# Patient Record
Sex: FEMALE | Race: BLACK OR AFRICAN AMERICAN | NOT HISPANIC OR LATINO | Employment: FULL TIME | URBAN - METROPOLITAN AREA
[De-identification: names, ages, dates, MRNs, and addresses within clinical notes are randomized per-mention and may not be internally consistent; named-entity substitution may affect disease eponyms.]

---

## 2017-12-02 ENCOUNTER — HOSPITAL ENCOUNTER (EMERGENCY)
Facility: HOSPITAL | Age: 37
Discharge: HOME | End: 2017-12-02
Attending: EMERGENCY MEDICINE
Payer: COMMERCIAL

## 2017-12-02 VITALS
WEIGHT: 168 LBS | RESPIRATION RATE: 20 BRPM | TEMPERATURE: 99 F | OXYGEN SATURATION: 100 % | DIASTOLIC BLOOD PRESSURE: 74 MMHG | HEART RATE: 77 BPM | SYSTOLIC BLOOD PRESSURE: 124 MMHG | BODY MASS INDEX: 27 KG/M2 | HEIGHT: 66 IN

## 2017-12-02 DIAGNOSIS — M25.559 HIP PAIN: ICD-10-CM

## 2017-12-02 DIAGNOSIS — M54.9 BACK PAIN: ICD-10-CM

## 2017-12-02 DIAGNOSIS — M25.579 ANKLE PAIN: ICD-10-CM

## 2017-12-02 DIAGNOSIS — M25.562 LEFT KNEE PAIN: ICD-10-CM
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
## 2017-12-02 LAB
B-HCG UR QL: NEGATIVE
CTP QC/QA: YES

## 2017-12-02 PROCEDURE — 25000003 PHARM REV CODE 250: Performed by: PHYSICIAN ASSISTANT

## 2017-12-02 PROCEDURE — 81025 URINE PREGNANCY TEST: CPT | Performed by: PHYSICIAN ASSISTANT

## 2017-12-02 PROCEDURE — 99284 EMERGENCY DEPT VISIT MOD MDM: CPT

## 2017-12-02 RX ORDER — TRAZODONE HYDROCHLORIDE 50 MG/1
50 TABLET ORAL NIGHTLY
COMMUNITY
End: 2019-02-08 | Stop reason: SDUPTHER

## 2017-12-02 RX ORDER — MELOXICAM 7.5 MG/1
7.5 TABLET ORAL DAILY
Status: DISCONTINUED | OUTPATIENT
Start: 2017-12-02 | End: 2017-12-02 | Stop reason: HOSPADM

## 2017-12-02 RX ORDER — MELOXICAM 7.5 MG/1
7.5 TABLET ORAL DAILY
Qty: 15 TABLET | Refills: 0 | Status: SHIPPED | OUTPATIENT
Start: 2017-12-02 | End: 2017-12-17

## 2017-12-02 RX ORDER — METHOCARBAMOL 500 MG/1
1000 TABLET, FILM COATED ORAL 3 TIMES DAILY PRN
Qty: 30 TABLET | Refills: 0 | Status: SHIPPED | OUTPATIENT
Start: 2017-12-02 | End: 2017-12-07

## 2017-12-02 RX ORDER — KETOROLAC TROMETHAMINE 30 MG/ML
15 INJECTION, SOLUTION INTRAMUSCULAR; INTRAVENOUS
Status: DISCONTINUED | OUTPATIENT
Start: 2017-12-02 | End: 2017-12-02

## 2017-12-02 RX ORDER — CITALOPRAM 20 MG/1
20 TABLET, FILM COATED ORAL DAILY
COMMUNITY
End: 2019-02-08 | Stop reason: SDUPTHER

## 2017-12-02 RX ADMIN — MELOXICAM 7.5 MG: 7.5 TABLET ORAL at 10:12
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
## 2017-12-02 NOTE — DISCHARGE INSTRUCTIONS
Stop taking Ibuprofen and begin taking Mobic as prescribed for pain. Also take Robaxin, muscle relaxer as needed for pain. Caution with using this as it can make you drowsy. Do not take before driving.     Follow up with your primary care doctor or one provided in 2 days.     Return to ER for worsening symptoms, bowel or bladder incontinence, numbness or tingling in groin or rectal region or as needed.
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
## 2018-05-20 ENCOUNTER — HOSPITAL ENCOUNTER (EMERGENCY)
Facility: HOSPITAL | Age: 38
Discharge: HOME | End: 2018-05-20
Attending: EMERGENCY MEDICINE
Payer: COMMERCIAL

## 2018-05-20 VITALS
OXYGEN SATURATION: 99 % | HEIGHT: 67 IN | TEMPERATURE: 98 F | BODY MASS INDEX: 25.9 KG/M2 | DIASTOLIC BLOOD PRESSURE: 74 MMHG | HEART RATE: 95 BPM | RESPIRATION RATE: 18 BRPM | SYSTOLIC BLOOD PRESSURE: 116 MMHG | WEIGHT: 165 LBS

## 2018-05-20 DIAGNOSIS — M54.2 NECK PAIN: ICD-10-CM
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
## 2018-05-20 LAB
B-HCG UR QL: NEGATIVE
CTP QC/QA: YES

## 2018-05-20 PROCEDURE — 63600175 PHARM REV CODE 636 W HCPCS: Performed by: NURSE PRACTITIONER

## 2018-05-20 PROCEDURE — 96372 THER/PROPH/DIAG INJ SC/IM: CPT

## 2018-05-20 PROCEDURE — 81025 URINE PREGNANCY TEST: CPT | Performed by: PHYSICIAN ASSISTANT

## 2018-05-20 PROCEDURE — 99284 EMERGENCY DEPT VISIT MOD MDM: CPT | Mod: 25

## 2018-05-20 RX ORDER — KETOROLAC TROMETHAMINE 30 MG/ML
15 INJECTION, SOLUTION INTRAMUSCULAR; INTRAVENOUS
Status: COMPLETED | OUTPATIENT
Start: 2018-05-20 | End: 2018-05-20

## 2018-05-20 RX ORDER — CYCLOBENZAPRINE HCL 10 MG
10 TABLET ORAL 3 TIMES DAILY PRN
Qty: 15 TABLET | Refills: 0 | Status: SHIPPED | OUTPATIENT
Start: 2018-05-20 | End: 2018-05-25

## 2018-05-20 RX ORDER — IBUPROFEN 400 MG/1
400 TABLET ORAL EVERY 6 HOURS PRN
Qty: 20 TABLET | Refills: 0 | Status: SHIPPED | OUTPATIENT
Start: 2018-05-20 | End: 2018-06-06 | Stop reason: HOSPADM

## 2018-05-20 RX ADMIN — KETOROLAC TROMETHAMINE 15 MG: 30 INJECTION INTRAMUSCULAR; INTRAVENOUS at 10:05
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
## 2018-05-20 NOTE — DISCHARGE INSTRUCTIONS
You have been prescribed FLEXERIL for pain. Please do not take this medication while working, drinking alcohol, swimming, or while driving/operating heavy machinery. This medication may cause drowsiness, impair judgment, and reduce physical capabilities.    You have been prescribed ibuprofen for pain. This is an Non-Steroidal Anti-Inflammatory (NSAID) Medication. Please do not take any additional NSAIDs while you are taking this medication including (Advil, Aleve, Motrin, Ibuprofen, Mobic\meloxicam, Naprosyn, etc.). Please stop taking this medication if you experience: weakness, itching, yellow skin or eyes, joint pains, vomiting blood, blood or black stools, unusual weight gain, or swelling in your arms, legs, hands, or feet.     Please return to the Emergency Department for any new or worsening symptoms including: fever, chest pain, shortness of breath, loss of consciousness, dizziness, weakness, or any other concerns.     Please follow up with your Primary Care Provider within in the week. If you do not have one, you may contact the one listed on your discharge paperwork or you may also call the [facility] Appointment Desk at [phone] to schedule an appointment with one.     Please take all medication as prescribed.
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
## 2018-06-06 ENCOUNTER — HOSPITAL ENCOUNTER (EMERGENCY)
Facility: HOSPITAL | Age: 38
Discharge: HOME | End: 2018-06-06
Attending: EMERGENCY MEDICINE
Payer: COMMERCIAL

## 2018-06-06 VITALS
HEART RATE: 74 BPM | OXYGEN SATURATION: 97 % | TEMPERATURE: 98 F | HEIGHT: 66 IN | SYSTOLIC BLOOD PRESSURE: 150 MMHG | RESPIRATION RATE: 17 BRPM | DIASTOLIC BLOOD PRESSURE: 56 MMHG | BODY MASS INDEX: 25.71 KG/M2 | WEIGHT: 160 LBS

## 2018-06-06 DIAGNOSIS — M25.519 SHOULDER PAIN: ICD-10-CM

## 2018-06-06 DIAGNOSIS — M54.2 NECK PAIN: Primary | ICD-10-CM

## 2018-06-06 LAB
B-HCG UR QL: NEGATIVE
CTP QC/QA: YES

## 2018-06-06 PROCEDURE — 96372 THER/PROPH/DIAG INJ SC/IM: CPT

## 2018-06-06 PROCEDURE — 25000003 PHARM REV CODE 250: Performed by: PHYSICIAN ASSISTANT

## 2018-06-06 PROCEDURE — 63600175 PHARM REV CODE 636 W HCPCS: Performed by: PHYSICIAN ASSISTANT

## 2018-06-06 PROCEDURE — 99284 EMERGENCY DEPT VISIT MOD MDM: CPT | Mod: 25

## 2018-06-06 PROCEDURE — 81025 URINE PREGNANCY TEST: CPT | Performed by: NURSE PRACTITIONER

## 2018-06-06 RX ORDER — CYCLOBENZAPRINE HCL 5 MG
5 TABLET ORAL 3 TIMES DAILY PRN
COMMUNITY
End: 2018-06-06 | Stop reason: HOSPADM

## 2018-06-06 RX ORDER — CYCLOBENZAPRINE HCL 10 MG
10 TABLET ORAL 3 TIMES DAILY PRN
Qty: 12 TABLET | Refills: 0 | Status: SHIPPED | OUTPATIENT
Start: 2018-06-06 | End: 2018-06-06 | Stop reason: HOSPADM

## 2018-06-06 RX ORDER — TIZANIDINE 4 MG/1
2 TABLET ORAL EVERY 8 HOURS
Qty: 12 TABLET | Refills: 0 | Status: SHIPPED | OUTPATIENT
Start: 2018-06-06 | End: 2018-06-13

## 2018-06-06 RX ORDER — ETODOLAC 200 MG/1
200 CAPSULE ORAL 3 TIMES DAILY PRN
Qty: 20 CAPSULE | Refills: 0 | Status: SHIPPED | OUTPATIENT
Start: 2018-06-06 | End: 2018-06-06

## 2018-06-06 RX ORDER — KETOROLAC TROMETHAMINE 30 MG/ML
15 INJECTION, SOLUTION INTRAMUSCULAR; INTRAVENOUS
Status: COMPLETED | OUTPATIENT
Start: 2018-06-06 | End: 2018-06-06

## 2018-06-06 RX ORDER — LIDOCAINE 50 MG/G
1 PATCH TOPICAL
Status: DISCONTINUED | OUTPATIENT
Start: 2018-06-06 | End: 2018-06-06 | Stop reason: HOSPADM

## 2018-06-06 RX ORDER — ETODOLAC 200 MG/1
200 CAPSULE ORAL 3 TIMES DAILY PRN
Qty: 20 CAPSULE | Refills: 0 | Status: SHIPPED | OUTPATIENT
Start: 2018-06-06 | End: 2018-06-13

## 2018-06-06 RX ORDER — LIDOCAINE 50 MG/G
1 PATCH TOPICAL DAILY
Qty: 15 PATCH | Refills: 0 | Status: SHIPPED | OUTPATIENT
Start: 2018-06-06 | End: 2018-06-21

## 2018-06-06 RX ADMIN — LIDOCAINE 1 PATCH: 50 PATCH TOPICAL at 08:06

## 2018-06-06 RX ADMIN — KETOROLAC TROMETHAMINE 30 MG: 30 INJECTION, SOLUTION INTRAMUSCULAR at 08:06
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
## 2018-06-07 NOTE — DISCHARGE INSTRUCTIONS
Take medications as prescribed.   Follow up with Spine Surgery. Return to ER for worsening symptoms or as needed.

## 2019-01-15 ENCOUNTER — LAB VISIT (OUTPATIENT)
Dept: LAB | Facility: HOSPITAL | Age: 39
End: 2019-01-15
Attending: INTERNAL MEDICINE
Payer: COMMERCIAL

## 2019-01-15 ENCOUNTER — OFFICE VISIT (OUTPATIENT)
Dept: FAMILY MEDICINE | Facility: CLINIC | Age: 39
End: 2019-01-15
Payer: COMMERCIAL

## 2019-01-15 VITALS
HEIGHT: 66 IN | DIASTOLIC BLOOD PRESSURE: 94 MMHG | HEART RATE: 81 BPM | SYSTOLIC BLOOD PRESSURE: 142 MMHG | BODY MASS INDEX: 29.58 KG/M2 | TEMPERATURE: 99 F | WEIGHT: 184.06 LBS | OXYGEN SATURATION: 99 %

## 2019-01-15 DIAGNOSIS — R03.0 ELEVATED BLOOD PRESSURE READING: ICD-10-CM

## 2019-01-15 DIAGNOSIS — Z00.00 HEALTH CARE MAINTENANCE: ICD-10-CM

## 2019-01-15 DIAGNOSIS — Z13.220 LIPID SCREENING: ICD-10-CM

## 2019-01-15 DIAGNOSIS — F33.0 MILD EPISODE OF RECURRENT MAJOR DEPRESSIVE DISORDER: ICD-10-CM

## 2019-01-15 DIAGNOSIS — G47.00 INSOMNIA, UNSPECIFIED TYPE: ICD-10-CM

## 2019-01-15 DIAGNOSIS — Z72.0 TOBACCO ABUSE: ICD-10-CM

## 2019-01-15 DIAGNOSIS — N93.8 DYSFUNCTIONAL UTERINE BLEEDING: ICD-10-CM

## 2019-01-15 DIAGNOSIS — N93.8 DYSFUNCTIONAL UTERINE BLEEDING: Primary | ICD-10-CM

## 2019-01-15 LAB
ALBUMIN SERPL BCP-MCNC: 3.9 G/DL
ALP SERPL-CCNC: 56 U/L
ALT SERPL W/O P-5'-P-CCNC: 11 U/L
ANION GAP SERPL CALC-SCNC: 4 MMOL/L
AST SERPL-CCNC: 15 U/L
BASOPHILS # BLD AUTO: 0.01 K/UL
BASOPHILS NFR BLD: 0.2 %
BILIRUB SERPL-MCNC: 0.2 MG/DL
BUN SERPL-MCNC: 9 MG/DL
CALCIUM SERPL-MCNC: 9.3 MG/DL
CHLORIDE SERPL-SCNC: 108 MMOL/L
CHOLEST SERPL-MCNC: 307 MG/DL
CHOLEST/HDLC SERPL: 3.4 {RATIO}
CO2 SERPL-SCNC: 28 MMOL/L
CREAT SERPL-MCNC: 0.8 MG/DL
DIFFERENTIAL METHOD: ABNORMAL
EOSINOPHIL # BLD AUTO: 0.1 K/UL
EOSINOPHIL NFR BLD: 2.2 %
ERYTHROCYTE [DISTWIDTH] IN BLOOD BY AUTOMATED COUNT: 16.1 %
EST. GFR  (AFRICAN AMERICAN): >60 ML/MIN/1.73 M^2
EST. GFR  (NON AFRICAN AMERICAN): >60 ML/MIN/1.73 M^2
ESTIMATED AVG GLUCOSE: 108 MG/DL
GLUCOSE SERPL-MCNC: 83 MG/DL
HBA1C MFR BLD HPLC: 5.4 %
HCT VFR BLD AUTO: 31.3 %
HDLC SERPL-MCNC: 89 MG/DL
HDLC SERPL: 29 %
HGB BLD-MCNC: 9.8 G/DL
LDLC SERPL CALC-MCNC: 205.2 MG/DL
LYMPHOCYTES # BLD AUTO: 1.7 K/UL
LYMPHOCYTES NFR BLD: 41.1 %
MCH RBC QN AUTO: 26.5 PG
MCHC RBC AUTO-ENTMCNC: 31.3 G/DL
MCV RBC AUTO: 85 FL
MONOCYTES # BLD AUTO: 0.3 K/UL
MONOCYTES NFR BLD: 6.4 %
NEUTROPHILS # BLD AUTO: 2 K/UL
NEUTROPHILS NFR BLD: 50.1 %
NONHDLC SERPL-MCNC: 218 MG/DL
PLATELET # BLD AUTO: 354 K/UL
PMV BLD AUTO: 9.1 FL
POTASSIUM SERPL-SCNC: 4.6 MMOL/L
PROT SERPL-MCNC: 7.6 G/DL
RBC # BLD AUTO: 3.7 M/UL
SODIUM SERPL-SCNC: 140 MMOL/L
TRIGL SERPL-MCNC: 64 MG/DL
TSH SERPL DL<=0.005 MIU/L-ACNC: 0.43 UIU/ML
WBC # BLD AUTO: 4.06 K/UL

## 2019-01-15 PROCEDURE — 99999 PR PBB SHADOW E&M-EST. PATIENT-LVL IV: CPT | Mod: PBBFAC,,, | Performed by: INTERNAL MEDICINE

## 2019-01-15 PROCEDURE — 84443 ASSAY THYROID STIM HORMONE: CPT

## 2019-01-15 PROCEDURE — 3008F PR BODY MASS INDEX (BMI) DOCUMENTED: ICD-10-PCS | Mod: CPTII,S$GLB,, | Performed by: INTERNAL MEDICINE

## 2019-01-15 PROCEDURE — 85025 COMPLETE CBC W/AUTO DIFF WBC: CPT

## 2019-01-15 PROCEDURE — 82672 ASSAY OF ESTROGEN: CPT

## 2019-01-15 PROCEDURE — 82679 ASSAY OF ESTRONE: CPT

## 2019-01-15 PROCEDURE — 83036 HEMOGLOBIN GLYCOSYLATED A1C: CPT

## 2019-01-15 PROCEDURE — 99204 PR OFFICE/OUTPT VISIT, NEW, LEVL IV, 45-59 MIN: ICD-10-PCS | Mod: S$GLB,,, | Performed by: INTERNAL MEDICINE

## 2019-01-15 PROCEDURE — 80053 COMPREHEN METABOLIC PANEL: CPT

## 2019-01-15 PROCEDURE — 84144 ASSAY OF PROGESTERONE: CPT

## 2019-01-15 PROCEDURE — 99204 OFFICE O/P NEW MOD 45 MIN: CPT | Mod: S$GLB,,, | Performed by: INTERNAL MEDICINE

## 2019-01-15 PROCEDURE — 3008F BODY MASS INDEX DOCD: CPT | Mod: CPTII,S$GLB,, | Performed by: INTERNAL MEDICINE

## 2019-01-15 PROCEDURE — 99999 PR PBB SHADOW E&M-EST. PATIENT-LVL IV: ICD-10-PCS | Mod: PBBFAC,,, | Performed by: INTERNAL MEDICINE

## 2019-01-15 PROCEDURE — 36415 COLL VENOUS BLD VENIPUNCTURE: CPT | Mod: PO

## 2019-01-15 PROCEDURE — 82670 ASSAY OF TOTAL ESTRADIOL: CPT | Mod: 91

## 2019-01-15 PROCEDURE — 80061 LIPID PANEL: CPT

## 2019-01-15 RX ORDER — LORATADINE 10 MG/1
10 TABLET ORAL DAILY
Refills: 1 | COMMUNITY
Start: 2019-01-07 | End: 2020-08-25

## 2019-01-15 NOTE — PROGRESS NOTES
SUBJECTIVE     Chief Complaint   Patient presents with    Establish Care    Menstrual Problem     Dec. lasted so long. Late this month.    Depression     been on meds x 2 years. not sure if it working. Feeling mor emotional at times.        MEHUL France is a 38 y.o. female with multiple medical diagnoses as listed in the medical history and problem list that presents for establishment of care. Pt presents as a transfer from a different facility as her insurance changed. Pt reports a recent issue with her menstrual cycle which was a cycle lasting ~2 weeks(10/27-11/6) then 11/29-12/10 then 1/3-1/8. This past month it was a week late, but was normal. She denies having any issues with her menses prior to this.     PAST MEDICAL HISTORY:  Past Medical History:   Diagnosis Date    Depression        PAST SURGICAL HISTORY:  Past Surgical History:   Procedure Laterality Date    BACK SURGERY         SOCIAL HISTORY:  Social History     Socioeconomic History    Marital status:      Spouse name: Not on file    Number of children: Not on file    Years of education: Not on file    Highest education level: Not on file   Social Needs    Financial resource strain: Not on file    Food insecurity - worry: Not on file    Food insecurity - inability: Not on file    Transportation needs - medical: Not on file    Transportation needs - non-medical: Not on file   Occupational History    Not on file   Tobacco Use    Smoking status: Current Every Day Smoker     Packs/day: 0.25     Types: Cigarettes    Smokeless tobacco: Current User   Substance and Sexual Activity    Alcohol use: Yes     Comment: weekends (wine)    Drug use: No    Sexual activity: Yes     Partners: Female   Other Topics Concern    Not on file   Social History Narrative    Not on file       FAMILY HISTORY:  Family History   Problem Relation Age of Onset    Depression Mother     Asthma Mother     Hypertension Father     Diabetes type II  "Father        ALLERGIES AND MEDICATIONS: updated and reviewed.  Review of patient's allergies indicates:   Allergen Reactions    Sulfa (sulfonamide antibiotics) Rash     Current Outpatient Medications   Medication Sig Dispense Refill    citalopram (CELEXA) 20 MG tablet Take 20 mg by mouth once daily.      loratadine (CLARITIN) 10 mg tablet Take 10 mg by mouth once daily.  1    traZODone (DESYREL) 50 MG tablet Take 50 mg by mouth every evening.       No current facility-administered medications for this visit.        ROS  Review of Systems   Constitutional: Negative for chills and fever.   HENT: Negative for hearing loss and sore throat.    Eyes: Negative for visual disturbance.   Respiratory: Negative for cough and shortness of breath.    Cardiovascular: Negative for chest pain, palpitations and leg swelling.   Gastrointestinal: Negative for abdominal pain, constipation, diarrhea, nausea and vomiting.   Genitourinary: Negative for dysuria, frequency and urgency.        Uterine bleeding   Musculoskeletal: Negative for arthralgias, joint swelling and myalgias.   Skin: Negative for rash and wound.   Neurological: Negative for headaches.   Psychiatric/Behavioral: Negative for agitation and confusion. The patient is not nervous/anxious.          OBJECTIVE     Physical Exam  Vitals:    01/15/19 1117   BP: (!) 142/94   Pulse: 81   Temp: 98.5 °F (36.9 °C)    Body mass index is 29.71 kg/m².  Weight: 83.5 kg (184 lb 1.4 oz)   Height: 5' 6" (167.6 cm)     Physical Exam   Constitutional: She is oriented to person, place, and time. She appears well-developed and well-nourished. No distress.   HENT:   Head: Normocephalic and atraumatic.   Right Ear: External ear normal.   Left Ear: External ear normal.   Nose: Nose normal.   Mouth/Throat: Oropharynx is clear and moist.   Eyes: Conjunctivae and EOM are normal. Right eye exhibits no discharge. Left eye exhibits no discharge. No scleral icterus.   Neck: Normal range of motion. " Neck supple. No JVD present. No tracheal deviation present.   Cardiovascular: Normal rate, regular rhythm and intact distal pulses. Exam reveals no gallop and no friction rub.   No murmur heard.  Pulmonary/Chest: Effort normal and breath sounds normal. No respiratory distress. She has no wheezes.   Abdominal: Soft. Bowel sounds are normal. She exhibits no distension and no mass. There is no tenderness. There is no rebound and no guarding.   Musculoskeletal: Normal range of motion. She exhibits no edema, tenderness or deformity.   Neurological: She is alert and oriented to person, place, and time. She exhibits normal muscle tone. Coordination normal.   Skin: Skin is warm and dry. No rash noted. No erythema.   Psychiatric: She has a normal mood and affect. Her behavior is normal. Judgment and thought content normal.         Health Maintenance    Patient has no pending health maintenance at this time           ASSESSMENT     38 y.o. female with     1. Dysfunctional uterine bleeding    2. Mild episode of recurrent major depressive disorder    3. Insomnia, unspecified type    4. Elevated blood pressure reading    5. Tobacco abuse    6. Health care maintenance    7. Lipid screening        PLAN:     1. Dysfunctional uterine bleeding  - Unknown etiology; likely 2/2 hormonal imbalance, but given abdominal cramping will also refer to Ob/Gyn since last pelvic exam in 2013  - Will also plan to check for hormone values as below  - Ambulatory referral to Obstetrics / Gynecology  - ESTROGENS, FRACTIONATED; Future  - ESTROGENS, TOTAL; Future  - PROGESTERONE; Future  - Estradiol; Future    2. Mild episode of recurrent major depressive disorder  - Stable; no acute issues  - The current medical regimen is effective;  continue present plan and medications.    3. Insomnia, unspecified type  - Stable; no acute issues  - The current medical regimen is effective;  continue present plan and medications.    4. Elevated blood pressure  reading  - BP elevated above goal of <140/90; unknown etiology  - Monitor    5. Tobacco abuse  - Pt advised to quit smoking; she acknowledges need, but does not express desire to quit    6. Health care maintenance  - Comprehensive metabolic panel; Future  - CBC auto differential; Future  - TSH; Future  - Hemoglobin A1c; Future    7. Lipid screening  - Lipid panel; Future        RTC in 4 weeks for nurse visit BP check     María Elena Garcia MD  01/15/2019 11:23 AM        No Follow-up on file.

## 2019-01-16 LAB
ESTRADIOL SERPL-MCNC: 115 PG/ML
PROGEST SERPL-MCNC: 1.6 NG/ML

## 2019-01-17 LAB
ESTRADIOL SERPL-MCNC: 126 PG/ML
ESTRONE SERPL-MCNC: 106 PG/ML

## 2019-01-18 DIAGNOSIS — D75.839 THROMBOCYTOSIS: Primary | ICD-10-CM

## 2019-01-18 DIAGNOSIS — D64.9 NORMOCYTIC ANEMIA: ICD-10-CM

## 2019-01-18 DIAGNOSIS — E78.00 PURE HYPERCHOLESTEROLEMIA: ICD-10-CM

## 2019-01-18 LAB — ESTROGEN SERPL-MCNC: 327 PG/ML

## 2019-01-18 RX ORDER — ROSUVASTATIN CALCIUM 40 MG/1
40 TABLET, COATED ORAL NIGHTLY
Qty: 90 TABLET | Refills: 3 | Status: SHIPPED | OUTPATIENT
Start: 2019-01-18 | End: 2019-02-15

## 2019-02-07 ENCOUNTER — PATIENT MESSAGE (OUTPATIENT)
Dept: FAMILY MEDICINE | Facility: CLINIC | Age: 39
End: 2019-02-07

## 2019-02-08 RX ORDER — CITALOPRAM 20 MG/1
20 TABLET, FILM COATED ORAL DAILY
Qty: 90 TABLET | Refills: 1 | Status: SHIPPED | OUTPATIENT
Start: 2019-02-08 | End: 2019-08-18 | Stop reason: SDUPTHER

## 2019-02-08 RX ORDER — TRAZODONE HYDROCHLORIDE 50 MG/1
50 TABLET ORAL NIGHTLY
Qty: 90 TABLET | Refills: 1 | Status: SHIPPED | OUTPATIENT
Start: 2019-02-08 | End: 2019-08-06 | Stop reason: SDUPTHER

## 2019-02-15 ENCOUNTER — OFFICE VISIT (OUTPATIENT)
Dept: OBSTETRICS AND GYNECOLOGY | Facility: CLINIC | Age: 39
End: 2019-02-15
Payer: COMMERCIAL

## 2019-02-15 VITALS
BODY MASS INDEX: 29.05 KG/M2 | HEIGHT: 66 IN | DIASTOLIC BLOOD PRESSURE: 90 MMHG | WEIGHT: 180.75 LBS | SYSTOLIC BLOOD PRESSURE: 128 MMHG

## 2019-02-15 DIAGNOSIS — R68.82 DECREASED LIBIDO: ICD-10-CM

## 2019-02-15 DIAGNOSIS — N89.8 VAGINAL DISCHARGE: ICD-10-CM

## 2019-02-15 DIAGNOSIS — Z01.419 WELL WOMAN EXAM WITH ROUTINE GYNECOLOGICAL EXAM: Primary | ICD-10-CM

## 2019-02-15 PROCEDURE — 99385 PR PREVENTIVE VISIT,NEW,18-39: ICD-10-PCS | Mod: S$GLB,,, | Performed by: OBSTETRICS & GYNECOLOGY

## 2019-02-15 PROCEDURE — 99999 PR PBB SHADOW E&M-EST. PATIENT-LVL III: ICD-10-PCS | Mod: PBBFAC,,, | Performed by: OBSTETRICS & GYNECOLOGY

## 2019-02-15 PROCEDURE — 88175 CYTOPATH C/V AUTO FLUID REDO: CPT

## 2019-02-15 PROCEDURE — 99999 PR PBB SHADOW E&M-EST. PATIENT-LVL III: CPT | Mod: PBBFAC,,, | Performed by: OBSTETRICS & GYNECOLOGY

## 2019-02-15 PROCEDURE — 99385 PREV VISIT NEW AGE 18-39: CPT | Mod: S$GLB,,, | Performed by: OBSTETRICS & GYNECOLOGY

## 2019-02-15 PROCEDURE — 87624 HPV HI-RISK TYP POOLED RSLT: CPT

## 2019-02-15 PROCEDURE — 87480 CANDIDA DNA DIR PROBE: CPT

## 2019-02-15 PROCEDURE — 87510 GARDNER VAG DNA DIR PROBE: CPT

## 2019-02-15 NOTE — PROGRESS NOTES
History & Physical  Gynecology      SUBJECTIVE:     Chief Complaint: Well Woman and Metrorrhagia       History of Present Illness:  Annual Exam-Premenopausal  Ms. France is a 39 y/o presents for annual exam. The patient complains of no libido since starting Trazadone and Celexa for Depression. She is unsure that merari wants to stop medications. The patient is sexually active with a female partner. Patient is  GYN screening history: last pap: approximate date  and was normal. The patient wears seatbelts: yes. The patient participates in regular exercise: not asked. Has the patient ever been transfused or tattooed?: yes tattoos. The patient reports that there is not domestic violence in her life.    Patient reports that she has a period that she bled for 2 weeks, -. She reports that she was one week late. She reports that her January period was normal.    Review of patient's allergies indicates:   Allergen Reactions    Sulfa (sulfonamide antibiotics) Rash       Past Medical History:   Diagnosis Date    Depression      Past Surgical History:   Procedure Laterality Date    BACK SURGERY       OB History      Para Term  AB Living    4         1    SAB TAB Ectopic Multiple Live Births                     Family History   Problem Relation Age of Onset    Depression Mother     Asthma Mother     Hypertension Father     Diabetes type II Father      Social History     Tobacco Use    Smoking status: Current Every Day Smoker     Packs/day: 0.25     Types: Cigarettes    Smokeless tobacco: Current User   Substance Use Topics    Alcohol use: Yes     Comment: weekends (wine)    Drug use: No       Current Outpatient Medications   Medication Sig    citalopram (CELEXA) 20 MG tablet Take 1 tablet (20 mg total) by mouth once daily.    loratadine (CLARITIN) 10 mg tablet Take 10 mg by mouth once daily.    traZODone (DESYREL) 50 MG tablet Take 1 tablet (50 mg total) by mouth  every evening.     No current facility-administered medications for this visit.          Review of Systems:  Review of Systems   Constitutional: Negative for chills and fever.   Respiratory: Negative for cough and wheezing.    Cardiovascular: Negative for chest pain and palpitations.   Gastrointestinal: Negative for abdominal pain, nausea and vomiting.   Genitourinary: Positive for decreased libido and menstrual problem. Negative for dysuria, frequency, hematuria, pelvic pain, vaginal bleeding, vaginal discharge and vaginal pain.        Episode of abnormal bleeding        OBJECTIVE:     Physical Exam:  Physical Exam   Constitutional: She is oriented to person, place, and time. She appears well-developed and well-nourished.   Cardiovascular: Normal rate and normal heart sounds.   Pulmonary/Chest: Effort normal. No respiratory distress. Breasts are symmetrical. There is no breast swelling.   Abdominal: Soft. She exhibits no distension. There is no tenderness.   Genitourinary: Rectum normal and uterus normal. No breast tenderness, discharge or bleeding. Pelvic exam was performed with patient supine. Cervix exhibits no motion tenderness. Right adnexum displays no fullness. Left adnexum displays no fullness. No bleeding in the vagina. Vaginal discharge found.   Neurological: She is oriented to person, place, and time.   Skin: Skin is warm and dry.   Psychiatric: She has a normal mood and affect.   Nursing note and vitals reviewed.      ASSESSMENT:       ICD-10-CM ICD-9-CM    1. Well woman exam with routine gynecological exam Z01.419 V72.31 Liquid-based pap smear, screening      HPV High Risk Genotypes, PCR   2. Decreased libido R68.82 799.81    3. Vaginal discharge N89.8 623.5 Vaginosis Screen by DNA Probe      Plan:      Claribel was seen today for well woman and metrorrhagia.    Diagnoses and all orders for this visit:    Well woman exam with routine gynecological exam  -     Liquid-based pap smear, screening  -     HPV  High Risk Genotypes, PCR  - Declined STD testing  - Mammogram not yet indicated    Decreased libido  - Discussed with patient that Celexa and Trazodone is likely the cause of her decreased libido.   - Patient has to discuss with PCP/Psychiatrist and Wife about changing her medication as it is the cause of her issues. Adding another medication to treat decreased libido is a possibility but there is a concern about constantly treating side effects which are affecting her life   - Patient is unsure and will make a decision at a later time    Vaginal discharge  -     Vaginosis Screen by DNA Probe        Orders Placed This Encounter   Procedures    HPV High Risk Genotypes, PCR    Vaginosis Screen by DNA Probe       Follow-up in about 3 months (around 5/15/2019) for follow up.     Counseling time: 15 minutes    Akosua Corcoran

## 2019-02-15 NOTE — PATIENT INSTRUCTIONS
Prevention Guidelines, Women Ages 18 to 39  Screening tests and vaccines are an important part of managing your health. Health counseling is essential, too. Below are guidelines for these, for women ages 18 to 39. Talk with your healthcare provider to make sure youre up-to-date on what you need.  Screening Who needs it How often   Alcohol misuse All women in this age group At routine exams   Blood pressure All women in this age group Every 2 years if your blood pressure is less than 120/80 mm Hg; yearly if your systolic blood pressure is 120 to 139 mm Hg, or your diastolic blood pressure reading is 80 to 89 mm Hg   Breast cancer All women in this age group should talk with their healthcare providers about the need for clinical breast exams (CBE)1 Clinical breast exam every 3 years1   Cervical cancer Women ages 21 and older Women between ages 21 and 29 should have a Pap test every 3 years; women between ages 30 and 65 are advised to have a Pap test plus an HPV test every 5 years   Chlamydia Sexually active women ages 24 and younger, and women at increased risk for infection Every 3 years if you're at risk or have symptoms   Depression All women in this age group At routine exams   Diabetes mellitus, type 2 Adults with no symptoms who are overweight or obese and have 1 or more other risk factors for diabetes At least every 3 years   Gonorrhea Sexually active women at increased risk for infection At routine exams   Hepatitis C Anyone at increased risk At routine exams   HIV All women At routine exams   Obesity All women in this age group At routine exams   Syphilis Women at increased risk for infection - talk with your healthcare provider At routine exams   Tuberculosis Women at increased risk for infection - talk with your healthcare provider Ask your healthcare provider   Vision All women in this age group At least 1 complete exam in your 20s, and 2 in your 30s   Vaccine2 Who needs it How often   Chickenpox  (varicella) All women in this age group who have no record of this infection or vaccine 2 doses; the second dose should be given 4 to 8 weeks after the first dose   Hepatitis A Women at increased risk for infection - talk with your healthcare provider 2 doses given at least 6 months apart   Hepatitis B Women at increased risk for infection - talk with your healthcare provider 3 doses over 6 months; second dose should be given 1 month after the first dose; the third dose should be given at least 2 months after the second dose and at least 4 months after the first dose   Haemophilus influenzae Type B (HIB) Women at increased risk for infection - talk with your healthcare provider 1 to 3 doses   Human papillomavirus (HPV) All women in this age group up to age 26 3 doses; the second dose should be given 1 to 2 months after the first dose and the third dose given 6 months after the first dose   Influenza (flu) All women in this age group Once a year   Measles, mumps, rubella (MMR) All women in this age group who have no record of these infections or vaccines 1 or 2 doses   Meningococcal Women at increased risk for infection - talk with your healthcare provider 1 or more doses   Pneumococcal conjugate vaccine (PCV13) and pneumococcal polysaccharide vaccine (PPSV23) Women at increased risk for infection - talk with your healthcare provider PCV13: 1 dose ages 19 to 65 (protects against 13 types of pneumococcal bacteria)  PPSV23: 1 to 2 doses through age 64, or 1 dose at 65 or older (protects against 23 types of pneumococcal bacteria)      Tetanus/diphtheria/pertussis (Td/Tdap) booster All women in this age group Td every 10 years, or a one-time dose of Tdap instead of a Td booster after age 18, then Td every 10 years   Counseling Who needs it How often   BRCA gene mutation testing for breast and ovarian cancer susceptibility Women with increased risk for having gene mutation When your risk is known   Breast cancer and  chemoprevention Women at high risk for breast cancer When your risk is known   Diet and exercise Women who are overweight or obese When diagnosed, and then at routine exams   Domestic violence Women at the age in which they are able to have children At routine exams   Sexually transmitted infection prevention Women who are sexually active At routine exams   Skin cancer Prevention of skin cancer in fair-skinned adults through age 24 At routine exams   Use of tobacco and the health effects it can cause All women in this age group Every visit   1According to the ACS, women ages 20 to 39 years should have a clinical breast exam (CBE) as part of their routine health exam every 3 years. Breast self-exams are an option for women starting in their 20s. But the  USPSTF does not recommend CBE.  2Those who are 18 years old and not up-to-date on their childhood vaccines should get all appropriate catch-up vaccines recommended by the CDC.  Date Last Reviewed: 8/27/2015  © 0221-0828 [employer]. [address] [place] [address], [place] [address]. All rights reserved. This information is not intended as a substitute for professional medical care. Always follow your healthcare professional's instructions.

## 2019-02-18 LAB
CANDIDA RRNA VAG QL PROBE: NEGATIVE
G VAGINALIS RRNA GENITAL QL PROBE: POSITIVE
T VAGINALIS RRNA GENITAL QL PROBE: NEGATIVE

## 2019-02-19 DIAGNOSIS — B96.89 BV (BACTERIAL VAGINOSIS): Primary | ICD-10-CM

## 2019-02-19 DIAGNOSIS — N76.0 BV (BACTERIAL VAGINOSIS): Primary | ICD-10-CM

## 2019-02-19 RX ORDER — CLINDAMYCIN HYDROCHLORIDE 300 MG/1
300 CAPSULE ORAL 2 TIMES DAILY
Qty: 14 CAPSULE | Refills: 0 | Status: SHIPPED | OUTPATIENT
Start: 2019-02-19 | End: 2019-02-26

## 2019-02-21 LAB
HPV HR 12 DNA CVX QL NAA+PROBE: NEGATIVE
HPV16 AG SPEC QL: NEGATIVE
HPV18 DNA SPEC QL NAA+PROBE: NEGATIVE

## 2019-05-02 ENCOUNTER — OFFICE VISIT (OUTPATIENT)
Dept: URGENT CARE | Facility: CLINIC | Age: 39
End: 2019-05-02
Payer: COMMERCIAL

## 2019-05-02 VITALS
OXYGEN SATURATION: 100 % | WEIGHT: 180 LBS | RESPIRATION RATE: 16 BRPM | BODY MASS INDEX: 28.93 KG/M2 | TEMPERATURE: 98 F | HEIGHT: 66 IN | SYSTOLIC BLOOD PRESSURE: 138 MMHG | HEART RATE: 109 BPM | DIASTOLIC BLOOD PRESSURE: 91 MMHG

## 2019-05-02 DIAGNOSIS — G43.111 INTRACTABLE MIGRAINE WITH AURA WITH STATUS MIGRAINOSUS: Primary | ICD-10-CM

## 2019-05-02 DIAGNOSIS — Z72.0 TOBACCO ABUSE: ICD-10-CM

## 2019-05-02 DIAGNOSIS — R11.0 NAUSEA: ICD-10-CM

## 2019-05-02 PROCEDURE — 96372 PR INJECTION,THERAP/PROPH/DIAG2ST, IM OR SUBCUT: ICD-10-PCS | Mod: S$GLB,,, | Performed by: EMERGENCY MEDICINE

## 2019-05-02 PROCEDURE — 96372 THER/PROPH/DIAG INJ SC/IM: CPT | Mod: S$GLB,,, | Performed by: EMERGENCY MEDICINE

## 2019-05-02 PROCEDURE — 3008F PR BODY MASS INDEX (BMI) DOCUMENTED: ICD-10-PCS | Mod: CPTII,S$GLB,, | Performed by: NURSE PRACTITIONER

## 2019-05-02 PROCEDURE — 3008F BODY MASS INDEX DOCD: CPT | Mod: CPTII,S$GLB,, | Performed by: NURSE PRACTITIONER

## 2019-05-02 PROCEDURE — 99214 OFFICE O/P EST MOD 30 MIN: CPT | Mod: 25,S$GLB,, | Performed by: NURSE PRACTITIONER

## 2019-05-02 PROCEDURE — 99214 PR OFFICE/OUTPT VISIT, EST, LEVL IV, 30-39 MIN: ICD-10-PCS | Mod: 25,S$GLB,, | Performed by: NURSE PRACTITIONER

## 2019-05-02 RX ORDER — BUTALBITAL, ACETAMINOPHEN AND CAFFEINE 50; 325; 40 MG/1; MG/1; MG/1
1 TABLET ORAL EVERY 6 HOURS PRN
Qty: 16 TABLET | Refills: 0 | Status: SHIPPED | OUTPATIENT
Start: 2019-05-02 | End: 2020-08-25

## 2019-05-02 RX ORDER — ONDANSETRON 4 MG/1
4 TABLET, ORALLY DISINTEGRATING ORAL
Status: COMPLETED | OUTPATIENT
Start: 2019-05-02 | End: 2019-05-02

## 2019-05-02 RX ORDER — KETOROLAC TROMETHAMINE 30 MG/ML
30 INJECTION, SOLUTION INTRAMUSCULAR; INTRAVENOUS
Status: COMPLETED | OUTPATIENT
Start: 2019-05-02 | End: 2019-05-02

## 2019-05-02 RX ORDER — PROMETHAZINE HYDROCHLORIDE 25 MG/1
25 TABLET ORAL EVERY 6 HOURS PRN
Qty: 12 TABLET | Refills: 0 | Status: SHIPPED | OUTPATIENT
Start: 2019-05-02 | End: 2020-08-25

## 2019-05-02 RX ADMIN — KETOROLAC TROMETHAMINE 30 MG: 30 INJECTION, SOLUTION INTRAMUSCULAR; INTRAVENOUS at 01:05

## 2019-05-02 RX ADMIN — ONDANSETRON 4 MG: 4 TABLET, ORALLY DISINTEGRATING ORAL at 01:05

## 2019-05-02 NOTE — PATIENT INSTRUCTIONS
"  Please return here or go to the Emergency Department for any concerns or worsening of condition.  If you were prescribed antibiotics, please take them to completion.  If you were prescribed a narcotic medication, do not drive or operate heavy equipment or machinery while taking these medications.  Please drink plenty of fluids.  Please get plenty of rest.  If not allergic, please take over the counter Tylenol (Acetaminophen) and/or Motrin (Ibuprofen) as directed for control of pain and/or fever.  Please follow up with your primary care doctor or specialist as needed.    If you  smoke, please stop smoking.    Migraine Headache: Stages and Treatment    A migraine headache tends to progress in stages. Learning these stages can help you better understand what is happening. Then you can learn ways to reduce pain and relieve other symptoms. Methods for relieving your symptoms include self-care and medicines.  Migraine stages  Migraines tend to progress through 4 stages. Many people don't have all stages, and stages may differ with each headache:  · Prodrome. A few hours to a day or so before the headache, you may feel tired, (yawning many times), uneasy, or damon. You may also feel bloated or crave certain foods.  · Aura. Up to an hour before the headache starts, some migraine sufferers experience aura--flashing lights, blind spots, other vision problems, confusion, difficulty speaking, or other neurologic symptoms.  · Headache. Moderate to severe pain affects one side of the head and then can spread to both sides, often along with nausea. You may be highly sensitive to light, sound, and odors. Vomiting or diarrhea may also happen. This stage lasts 4 to 72 hours.  · Postdrome. After your headache ends, you may feel tired, achy, and "washed out." This may last for a day or so.  Self-care during a migraine  Here is what you can do:  · Use a cold compress. Wrap a thin cloth around a cold pack, a cold can of soda, or a bag " of frozen vegetables. Apply this to your temple or other pain site.  · Drink fluids. If nausea makes it hard to drink, try sucking on ice.  · Rest. If possible, lie down. Try not to bend over, as this may increase your pain. Sometimes laying in a dark quiet room can help the migraine from being aggravated.    · Try caffeine. Some people find that drinking fluids with caffeine, such as coffee or tea, helps to lessen migraine pain.  Using medicines  Work with your healthcare provider to find the right medicines for you. Medicines for migraine may relieve pain (analgesics), relieve nausea, or attack the migraine's root causes (migraine-specific medicines).  Rebound headache  Taking analgesics each day, or even several times a week, may lead to more frequent and severe headaches. These are called rebound headaches. If you think you're having rebound headaches, tell your healthcare provider. He or she can help you safely decrease your medicine. Rebound caffeine withdrawal headaches can also happen.    Date Last Reviewed: 10/9/2015  © 8688-9854 [employer]. [address] [place] [address], [place] [address]. All rights reserved. This information is not intended as a substitute for professional medical care. Always follow your healthcare professional's instructions.

## 2019-05-02 NOTE — PROGRESS NOTES
"Subjective:       Patient ID: Claribel France is a 39 y.o. female.    Vitals:  height is 5' 6" (1.676 m) and weight is 81.6 kg (180 lb). Her oral temperature is 98.3 °F (36.8 °C). Her blood pressure is 138/91 (abnormal) and her pulse is 109. Her respiration is 16 and oxygen saturation is 100%.     Chief Complaint: Migraine    Patient presents to the clinic with complaints of a migraine for the last 3 days.  Has taken Excedrin, Advil, neck surgery Tylenol without relief.  States that she is seeing squiggly lines and having nausea.    Migraine    This is a new problem. The current episode started in the past 7 days. The problem occurs constantly. The problem has been unchanged. The pain is located in the temporal, retro-orbital, parietal, bilateral, frontal and occipital region. The pain does not radiate. The pain quality is similar to prior headaches. The quality of the pain is described as throbbing (burning). The pain is at a severity of 10/10. The pain is severe. Associated symptoms include blurred vision, dizziness, eye watering, facial sweating, nausea, phonophobia, photophobia and scalp tenderness. Pertinent negatives include no abdominal pain, abnormal behavior, anorexia, back pain, coughing, drainage, ear pain, eye pain, eye redness, fever, hearing loss, insomnia, loss of balance, muscle aches, neck pain, numbness, rhinorrhea, seizures, sinus pressure, sore throat, swollen glands, tingling, tinnitus, visual change, vomiting, weakness or weight loss. The symptoms are aggravated by bright light, noise and activity. She has tried NSAIDs and acetaminophen for the symptoms. The treatment provided no relief. There is no history of cancer, cluster headaches, hypertension, immunosuppression, migraine headaches, migraines in the family, obesity, pseudotumor cerebri, recent head traumas, sinus disease or TMJ.       Constitution: Positive for fatigue. Negative for chills and fever.   HENT: Negative for ear pain, " tinnitus, hearing loss, congestion, sinus pressure and sore throat.    Neck: Negative for neck pain and painful lymph nodes.   Cardiovascular: Negative for chest pain and leg swelling.   Eyes: Positive for photophobia and blurred vision. Negative for eye pain, eye redness and double vision.   Respiratory: Negative for cough and shortness of breath.    Gastrointestinal: Positive for nausea. Negative for abdominal pain, vomiting and diarrhea.   Genitourinary: Negative for dysuria, frequency, urgency and history of kidney stones.   Musculoskeletal: Negative for joint pain, joint swelling, back pain, muscle cramps and muscle ache.   Skin: Negative for color change, pale, rash and bruising.   Allergic/Immunologic: Negative for seasonal allergies.   Neurological: Positive for dizziness, light-headedness and headaches. Negative for history of vertigo, passing out, loss of balance, history of migraines, numbness and seizures.   Hematologic/Lymphatic: Negative for swollen lymph nodes.   Psychiatric/Behavioral: Negative for nervous/anxious, sleep disturbance and depression. The patient is not nervous/anxious and does not have insomnia.        Objective:      Physical Exam   Constitutional: She is oriented to person, place, and time. Vital signs are normal. She appears well-developed and well-nourished. She is cooperative.  Non-toxic appearance. She appears ill. She appears distressed.   HENT:   Head: Normocephalic and atraumatic.   Right Ear: Hearing, tympanic membrane, external ear and ear canal normal.   Left Ear: Hearing, tympanic membrane, external ear and ear canal normal.   Nose: Nose normal. No mucosal edema, rhinorrhea or nasal deformity. No epistaxis. Right sinus exhibits no maxillary sinus tenderness and no frontal sinus tenderness. Left sinus exhibits no maxillary sinus tenderness and no frontal sinus tenderness.   Mouth/Throat: Uvula is midline, oropharynx is clear and moist and mucous membranes are normal. No  trismus in the jaw. Normal dentition. No uvula swelling. No posterior oropharyngeal erythema.   No temporal TTP   Eyes: Pupils are equal, round, and reactive to light. Conjunctivae, EOM and lids are normal. No scleral icterus.   Sclera clear bilat   Neck: Trachea normal, normal range of motion, full passive range of motion without pain and phonation normal. Neck supple. No neck rigidity.   Cardiovascular: Normal rate, regular rhythm, normal heart sounds, intact distal pulses and normal pulses.   Pulmonary/Chest: Effort normal and breath sounds normal. No respiratory distress.   Abdominal: Soft. Normal appearance and bowel sounds are normal. She exhibits no distension. There is no tenderness.   Musculoskeletal: Normal range of motion. She exhibits no edema or deformity.   Neurological: She is alert and oriented to person, place, and time. She has normal strength. She displays normal reflexes. No cranial nerve deficit or sensory deficit. She exhibits normal muscle tone. She displays a negative Romberg sign. Coordination normal. GCS eye subscore is 4. GCS verbal subscore is 5. GCS motor subscore is 6.   Skin: Skin is warm, dry and intact. Capillary refill takes less than 2 seconds. She is not diaphoretic. No pallor.   Psychiatric: She has a normal mood and affect. Her speech is normal and behavior is normal. Judgment and thought content normal. Cognition and memory are normal.   Nursing note and vitals reviewed.      Assessment:       1. Intractable migraine with aura with status migrainosus    2. Nausea        Plan:         Intractable migraine with aura with status migrainosus  -     ketorolac injection 30 mg  -     ondansetron disintegrating tablet 4 mg  -     promethazine (PHENERGAN) 25 MG tablet; Take 1 tablet (25 mg total) by mouth every 6 (six) hours as needed for Nausea.  Dispense: 12 tablet; Refill: 0  -     butalbital-acetaminophen-caffeine -40 mg (FIORICET, ESGIC) -40 mg per tablet; Take 1 tablet  by mouth every 6 (six) hours as needed for Headaches.  Dispense: 16 tablet; Refill: 0    Nausea  -     ondansetron disintegrating tablet 4 mg  -     promethazine (PHENERGAN) 25 MG tablet; Take 1 tablet (25 mg total) by mouth every 6 (six) hours as needed for Nausea.  Dispense: 12 tablet; Refill: 0      Patient Instructions       Please return here or go to the Emergency Department for any concerns or worsening of condition.  If you were prescribed antibiotics, please take them to completion.  If you were prescribed a narcotic medication, do not drive or operate heavy equipment or machinery while taking these medications.  Please drink plenty of fluids.  Please get plenty of rest.  If not allergic, please take over the counter Tylenol (Acetaminophen) and/or Motrin (Ibuprofen) as directed for control of pain and/or fever.  Please follow up with your primary care doctor or specialist as needed.    If you  smoke, please stop smoking.    Migraine Headache: Stages and Treatment    A migraine headache tends to progress in stages. Learning these stages can help you better understand what is happening. Then you can learn ways to reduce pain and relieve other symptoms. Methods for relieving your symptoms include self-care and medicines.  Migraine stages  Migraines tend to progress through 4 stages. Many people don't have all stages, and stages may differ with each headache:  · Prodrome. A few hours to a day or so before the headache, you may feel tired, (yawning many times), uneasy, or damon. You may also feel bloated or crave certain foods.  · Aura. Up to an hour before the headache starts, some migraine sufferers experience aura--flashing lights, blind spots, other vision problems, confusion, difficulty speaking, or other neurologic symptoms.  · Headache. Moderate to severe pain affects one side of the head and then can spread to both sides, often along with nausea. You may be highly sensitive to light, sound, and odors.  "Vomiting or diarrhea may also happen. This stage lasts 4 to 72 hours.  · Postdrome. After your headache ends, you may feel tired, achy, and "washed out." This may last for a day or so.  Self-care during a migraine  Here is what you can do:  · Use a cold compress. Wrap a thin cloth around a cold pack, a cold can of soda, or a bag of frozen vegetables. Apply this to your temple or other pain site.  · Drink fluids. If nausea makes it hard to drink, try sucking on ice.  · Rest. If possible, lie down. Try not to bend over, as this may increase your pain. Sometimes laying in a dark quiet room can help the migraine from being aggravated.    · Try caffeine. Some people find that drinking fluids with caffeine, such as coffee or tea, helps to lessen migraine pain.  Using medicines  Work with your healthcare provider to find the right medicines for you. Medicines for migraine may relieve pain (analgesics), relieve nausea, or attack the migraine's root causes (migraine-specific medicines).  Rebound headache  Taking analgesics each day, or even several times a week, may lead to more frequent and severe headaches. These are called rebound headaches. If you think you're having rebound headaches, tell your healthcare provider. He or she can help you safely decrease your medicine. Rebound caffeine withdrawal headaches can also happen.    Date Last Reviewed: 10/9/2015  © 5568-8604 [employer]. [address] [place] [address], [place] [address]. All rights reserved. This information is not intended as a substitute for professional medical care. Always follow your healthcare professional's instructions.             "

## 2019-05-02 NOTE — LETTER
May 2, 2019      [facility] [address] [place] [address]  Phone: [phone]  Fax: [phone]       Patient: Claribel France   YOB: 1980  Date of Visit: 05/02/2019    To Whom It May Concern:    Jose Guadalupe France  was at [facility] on 05/02/2019. She may return to work/school on 05/04/19 with no restrictions. If you have any questions or concerns, or if I can be of further assistance, please do not hesitate to contact me.    Sincerely,    Ruth Vidales NP

## 2019-06-10 ENCOUNTER — OFFICE VISIT (OUTPATIENT)
Dept: URGENT CARE | Facility: CLINIC | Age: 39
End: 2019-06-10
Payer: COMMERCIAL

## 2019-06-10 VITALS
BODY MASS INDEX: 28.93 KG/M2 | HEART RATE: 64 BPM | SYSTOLIC BLOOD PRESSURE: 124 MMHG | WEIGHT: 180 LBS | OXYGEN SATURATION: 99 % | DIASTOLIC BLOOD PRESSURE: 70 MMHG | HEIGHT: 66 IN | TEMPERATURE: 98 F

## 2019-06-10 DIAGNOSIS — M54.32 SCIATICA OF LEFT SIDE: Primary | ICD-10-CM

## 2019-06-10 DIAGNOSIS — M46.1 SI (SACROILIAC) JOINT INFLAMMATION: ICD-10-CM

## 2019-06-10 PROCEDURE — 99214 OFFICE O/P EST MOD 30 MIN: CPT | Mod: S$GLB,,, | Performed by: PHYSICIAN ASSISTANT

## 2019-06-10 PROCEDURE — 3008F BODY MASS INDEX DOCD: CPT | Mod: CPTII,S$GLB,, | Performed by: PHYSICIAN ASSISTANT

## 2019-06-10 PROCEDURE — 99214 PR OFFICE/OUTPT VISIT, EST, LEVL IV, 30-39 MIN: ICD-10-PCS | Mod: S$GLB,,, | Performed by: PHYSICIAN ASSISTANT

## 2019-06-10 PROCEDURE — 3008F PR BODY MASS INDEX (BMI) DOCUMENTED: ICD-10-PCS | Mod: CPTII,S$GLB,, | Performed by: PHYSICIAN ASSISTANT

## 2019-06-10 RX ORDER — TIZANIDINE 4 MG/1
4 TABLET ORAL EVERY 8 HOURS
Qty: 20 TABLET | Refills: 0 | Status: SHIPPED | OUTPATIENT
Start: 2019-06-10 | End: 2020-08-25

## 2019-06-10 RX ORDER — NAPROXEN 500 MG/1
500 TABLET ORAL 2 TIMES DAILY WITH MEALS
Qty: 60 TABLET | Refills: 2 | Status: SHIPPED | OUTPATIENT
Start: 2019-06-10 | End: 2020-06-09

## 2019-06-10 NOTE — PROGRESS NOTES
"Subjective:       Patient ID: Claribel France is a 39 y.o. female.    Vitals:  height is 5' 6" (1.676 m) and weight is 81.6 kg (180 lb). Her temperature is 98.1 °F (36.7 °C). Her blood pressure is 124/70 and her pulse is 64. Her oxygen saturation is 99%.     Chief Complaint: Sciatica    Pt reports she has a hx of sciatica she c/o pain that is radiating to her left buttocks ,she took aleve back and pain with no relief    Back Pain   This is a chronic problem. The current episode started yesterday. The problem occurs constantly. The problem has been gradually worsening since onset. The pain is present in the lumbar spine. Radiates to: left buttock  The pain is at a severity of 10/10. The pain is severe. The pain is the same all the time. The symptoms are aggravated by standing and lying down (walking). Pertinent negatives include no abdominal pain, bladder incontinence, bowel incontinence, dysuria or numbness. She has tried heat and muscle relaxant (advil back and pain otc med) for the symptoms. The treatment provided mild relief.       Constitution: Negative for fatigue.   Gastrointestinal: Negative for abdominal pain and bowel incontinence.   Genitourinary: Negative for dysuria, urgency, bladder incontinence and hematuria.   Musculoskeletal: Positive for back pain. Negative for muscle cramps and history of spine disorder.   Skin: Negative for rash and erythema.   Neurological: Negative for coordination disturbances, numbness and tingling.       Objective:      Physical Exam   Constitutional: She is oriented to person, place, and time. She appears well-developed and well-nourished.   HENT:   Head: Normocephalic and atraumatic.   Eyes: Conjunctivae are normal.   Neck: Normal range of motion. Neck supple. No thyromegaly present.   Cardiovascular: Normal rate and regular rhythm. Exam reveals no gallop and no friction rub.   No murmur heard.  Pulmonary/Chest: Effort normal and breath sounds normal. She has no wheezes. " She has no rales.   Musculoskeletal:        Lumbar back: She exhibits decreased range of motion, tenderness (left SI joint and left buttock), pain and spasm. She exhibits no bony tenderness.   Lymphadenopathy:     She has no cervical adenopathy.   Neurological: She is alert and oriented to person, place, and time. She has normal strength. No sensory deficit.   Reflex Scores:       Patellar reflexes are 2+ on the right side and 2+ on the left side.  Skin: Skin is warm and dry. No rash noted. No erythema.   Psychiatric: She has a normal mood and affect. Her behavior is normal. Judgment and thought content normal.   Nursing note and vitals reviewed.      Assessment:       1. Sciatica of left side    2. SI (sacroiliac) joint inflammation        Plan:         Sciatica of left side  -     naproxen (NAPROSYN) 500 MG tablet; Take 1 tablet (500 mg total) by mouth 2 (two) times daily with meals.  Dispense: 60 tablet; Refill: 2  -     tiZANidine (ZANAFLEX) 4 MG tablet; Take 1 tablet (4 mg total) by mouth every 8 (eight) hours.  Dispense: 20 tablet; Refill: 0    SI (sacroiliac) joint inflammation  -     naproxen (NAPROSYN) 500 MG tablet; Take 1 tablet (500 mg total) by mouth 2 (two) times daily with meals.  Dispense: 60 tablet; Refill: 2  -     tiZANidine (ZANAFLEX) 4 MG tablet; Take 1 tablet (4 mg total) by mouth every 8 (eight) hours.  Dispense: 20 tablet; Refill: 0        Claribel was seen today for sciatica.    Diagnoses and all orders for this visit:    Sciatica of left side  -     naproxen (NAPROSYN) 500 MG tablet; Take 1 tablet (500 mg total) by mouth 2 (two) times daily with meals.  -     tiZANidine (ZANAFLEX) 4 MG tablet; Take 1 tablet (4 mg total) by mouth every 8 (eight) hours.    SI (sacroiliac) joint inflammation  -     naproxen (NAPROSYN) 500 MG tablet; Take 1 tablet (500 mg total) by mouth 2 (two) times daily with meals.  -     tiZANidine (ZANAFLEX) 4 MG tablet; Take 1 tablet (4 mg total) by mouth every 8 (eight)  hours.      Patient Instructions   - Rest.    - Drink plenty of fluids.    - Tylenol or Ibuprofen as directed as needed for fever/pain.    - No heavy lifting.    - Low heat to areas of pain for 20 minutes at a time.  - Go to the ER for any weakness or sensation loss of the legs, or loss of bowel or bladder control.  - Follow up with your PCP or specialty clinic as directed in the next 1-2 weeks if not improved or as needed.  You can call [phone] to schedule an appointment with the appropriate provider.    - Go to the ED if your symptoms worsen.  - You must understand that you have received an Urgent Care treatment only and that you may be released before all of your medical problems are known or treated.   - You, the patient, will arrange for follow up care as instructed.   - If your condition worsens or fails to improve we recommend that you receive another evaluation at the ER immediately or contact your PCP to discuss your concerns or return here.       Sciatica    Sciatica is a condition that causes pain in the lower back that spreads down into the buttock, hip, and leg. Sometimes the leg pain can happen without any back pain. Sciatica happens when a spinal nerve is irritated or has pressure put on it as comes out of the spinal canal in the lower back. This most often happens when a bulge or rupture of a nearby spinal disk presses on the nerve. Sciatica can also be caused by a narrowing of the spinal canal (spinal stenosis) or spasm of the muscle in the buttocks that the sciatic nerve passes through (pyriform muscle). Sciatica is also called lumbar radiculopathy.  Sciatica may begin after a sudden twisting or bending force, such as in a car accident. Or it can happen after a simple awkward movement. In either case, muscle spasm often also happens. Muscle spasm makes the pain worse.  A healthcare provider makes a diagnosis of sciatica from your symptoms and a physical exam. Unless you had an injury from a  car accident or fall, you usually wont have X-rays taken at this time. This is because the nerves and disks in your back cant be seen on an X-ray. If the provider sees signs of a compressed nerve, you will need to schedule an MRI scan as an outpatient. Signs of a compressed nerve include loss of strength in a leg.  Most sciatica gets better with medicine, exercise, and physical therapy. If your symptoms continue after at least 3 months of medical treatment, you may need surgery or injections to your lower back.  Home care  Follow these tips when caring for yourself at home:  · You may need to stay in bed the first few days. But as soon as possible, begin sitting up or walking. This will help you avoid problems that come from staying in bed for long periods.  · When in bed, try to find a position that is comfortable. A firm mattress is best. Try lying flat on your back with pillows under your knees. You can also try lying on your side with your knees bent up toward your chest and a pillow between your knees.  · Avoid sitting for long periods. This puts more stress on your lower back than standing or walking.  · Use heat from a hot shower, hot bath, or heating pad to help ease pain. Massage can also help. You can also try using an ice pack. You can make your own ice pack by putting ice cubes in a plastic bag. Wrap the bag in a thin towel. Try both heat and cold to see which works best. Use the method that feels best for 20 minutes several times a day.  · You may use acetaminophen or ibuprofen to ease pain, unless another pain medicine was prescribed. Note: If you have chronic liver or kidney disease, talk with your healthcare provider before taking these medicines. Also talk with your provider if youve had a stomach ulcer or gastrointestinal bleeding.  · Use safe lifting methods. Dont lift anything heavier than 15 pounds until all of the pain is gone.  Follow-up care  Follow up with your healthcare provider, or as  advised. You may need physical therapy or additional tests.  If X-rays were taken, a radiologist will look at them. You will be told of any new findings that may affect your care.  When to seek medical advice  Call your healthcare provider right away if any of these occur:  · Pain gets worse even after taking prescribed medicine  · Weakness or numbness in 1 or both legs or hips  · Numbness in your groin or genital area  · You cant control your bowel or bladder  · Fever  · Redness or swelling over your back or spine   Date Last Reviewed: 8/1/2016 © 2000-2017 [employer]. [address] [place] [address]. All rights reserved. This information is not intended as a substitute for professional medical care. Always follow your healthcare professional's instructions.        Sacroiliitis  The sacrum is the triangle-shaped bone at the base of the spine. It is linked to the other pelvis bones by the sacroiliac joints, also called SI joints. Sacroiliitis is when one or both SI joints are hurt or inflamed. It can make small movements of the lower back and pelvis very painful.        This condition has been linked to other diseases. They include ankylosing spondylitis, rheumatoid arthritis, psoriasis, and Crohns disease or colitis. Symptoms may include pain or stiffness in the hips, lower back, thighs, or buttocks. Pain occurs most often in the morning or after sitting for long periods of time. The pain may get worse when you walk. The swinging motion of the hips strains the SI joints.  Sacroiliitis is caused by many factors such as:  · Heavy lifting, especially if not done the right way  · Severe injury, such as a fall or car accident  · Osteoarthritis  · Pregnancy  · Infection of the joint  This condition is hard to diagnose. It may be confused with other causes of low back pain. To confirm the diagnosis, you may be given a shot of numbing medicine in the SI joint. Treatment includes rest, physical  therapy, and anti-inflammatory medicines. If another health problem is the cause, then that must also be treated. More testing may be needed if your symptoms dont get better.  Home care  · If your healthcare provider has prescribed medicines, take all of them as directed.  · You may use over-the-counter pain medicine to control pain, unless another medicine was prescribed. If prednisone was prescribed, dont use NSAIDs, or nonsteroidal anti-inflammatory drugs, such as ibuprofen or naproxen. Talk with your provider before using this medicine if you have chronic liver or kidney disease or ever had a stomach ulcer or GI bleeding.  · If you were referred to physical therapy, make an appointment. Be sure to do any prescribed exercises.  · Dont smoke. Smoking reduces blood flow to the inflamed area. This makes it harder to treat.  Follow-up care  Follow up with your healthcare provider, or as advised.  If you had an X-ray or an MRI, you will be notified of any new findings that may affect your care.  When to seek medical advice  Contact your healthcare provider right away if any of these occur:  · Increasing low back pain  · Inflammation of the eyes  · Skin rash or redness  · Weakness or numbness in one or both legs  · Loss of bowel or bladder control  · Numbness in the groin area  Date Last Reviewed: 11/24/2015  © 1127-9137 [employer]. [address] [place] [address], [place] [address]. All rights reserved. This information is not intended as a substitute for professional medical care. Always follow your healthcare professional's instructions.

## 2019-06-10 NOTE — LETTER
Libia 10, 2019      [facility] [address], [address]  [place] [address]  Phone: [phone]  Fax: [phone]       Patient: Claribel France   YOB: 1980  Date of Visit: 06/10/2019    To Whom It May Concern:    Jose Guadalupe France  was at [facility] on 06/10/2019. She may return to work/school on 06/12/2019 with no restrictions. If you have any questions or concerns, or if I can be of further assistance, please do not hesitate to contact me.    Sincerely,        Mary Wilder PA-C

## 2019-06-10 NOTE — PATIENT INSTRUCTIONS
- Rest.    - Drink plenty of fluids.    - Tylenol or Ibuprofen as directed as needed for fever/pain.    - No heavy lifting.    - Low heat to areas of pain for 20 minutes at a time.  - Go to the ER for any weakness or sensation loss of the legs, or loss of bowel or bladder control.  - Follow up with your PCP or specialty clinic as directed in the next 1-2 weeks if not improved or as needed.  You can call [phone] to schedule an appointment with the appropriate provider.    - Go to the ED if your symptoms worsen.  - You must understand that you have received an Urgent Care treatment only and that you may be released before all of your medical problems are known or treated.   - You, the patient, will arrange for follow up care as instructed.   - If your condition worsens or fails to improve we recommend that you receive another evaluation at the ER immediately or contact your PCP to discuss your concerns or return here.       Sciatica    Sciatica is a condition that causes pain in the lower back that spreads down into the buttock, hip, and leg. Sometimes the leg pain can happen without any back pain. Sciatica happens when a spinal nerve is irritated or has pressure put on it as comes out of the spinal canal in the lower back. This most often happens when a bulge or rupture of a nearby spinal disk presses on the nerve. Sciatica can also be caused by a narrowing of the spinal canal (spinal stenosis) or spasm of the muscle in the buttocks that the sciatic nerve passes through (pyriform muscle). Sciatica is also called lumbar radiculopathy.  Sciatica may begin after a sudden twisting or bending force, such as in a car accident. Or it can happen after a simple awkward movement. In either case, muscle spasm often also happens. Muscle spasm makes the pain worse.  A healthcare provider makes a diagnosis of sciatica from your symptoms and a physical exam. Unless you had an injury from a car accident or fall, you usually  wont have X-rays taken at this time. This is because the nerves and disks in your back cant be seen on an X-ray. If the provider sees signs of a compressed nerve, you will need to schedule an MRI scan as an outpatient. Signs of a compressed nerve include loss of strength in a leg.  Most sciatica gets better with medicine, exercise, and physical therapy. If your symptoms continue after at least 3 months of medical treatment, you may need surgery or injections to your lower back.  Home care  Follow these tips when caring for yourself at home:  · You may need to stay in bed the first few days. But as soon as possible, begin sitting up or walking. This will help you avoid problems that come from staying in bed for long periods.  · When in bed, try to find a position that is comfortable. A firm mattress is best. Try lying flat on your back with pillows under your knees. You can also try lying on your side with your knees bent up toward your chest and a pillow between your knees.  · Avoid sitting for long periods. This puts more stress on your lower back than standing or walking.  · Use heat from a hot shower, hot bath, or heating pad to help ease pain. Massage can also help. You can also try using an ice pack. You can make your own ice pack by putting ice cubes in a plastic bag. Wrap the bag in a thin towel. Try both heat and cold to see which works best. Use the method that feels best for 20 minutes several times a day.  · You may use acetaminophen or ibuprofen to ease pain, unless another pain medicine was prescribed. Note: If you have chronic liver or kidney disease, talk with your healthcare provider before taking these medicines. Also talk with your provider if youve had a stomach ulcer or gastrointestinal bleeding.  · Use safe lifting methods. Dont lift anything heavier than 15 pounds until all of the pain is gone.  Follow-up care  Follow up with your healthcare provider, or as advised. You may need physical  therapy or additional tests.  If X-rays were taken, a radiologist will look at them. You will be told of any new findings that may affect your care.  When to seek medical advice  Call your healthcare provider right away if any of these occur:  · Pain gets worse even after taking prescribed medicine  · Weakness or numbness in 1 or both legs or hips  · Numbness in your groin or genital area  · You cant control your bowel or bladder  · Fever  · Redness or swelling over your back or spine   Date Last Reviewed: 8/1/2016 © 2000-2017 [employer]. [address] [place] [id number]. All rights reserved. This information is not intended as a substitute for professional medical care. Always follow your healthcare professional's instructions.        Sacroiliitis  The sacrum is the triangle-shaped bone at the base of the spine. It is linked to the other pelvis bones by the sacroiliac joints, also called SI joints. Sacroiliitis is when one or both SI joints are hurt or inflamed. It can make small movements of the lower back and pelvis very painful.        This condition has been linked to other diseases. They include ankylosing spondylitis, rheumatoid arthritis, psoriasis, and Crohns disease or colitis. Symptoms may include pain or stiffness in the hips, lower back, thighs, or buttocks. Pain occurs most often in the morning or after sitting for long periods of time. The pain may get worse when you walk. The swinging motion of the hips strains the SI joints.  Sacroiliitis is caused by many factors such as:  · Heavy lifting, especially if not done the right way  · Severe injury, such as a fall or car accident  · Osteoarthritis  · Pregnancy  · Infection of the joint  This condition is hard to diagnose. It may be confused with other causes of low back pain. To confirm the diagnosis, you may be given a shot of numbing medicine in the SI joint. Treatment includes rest, physical therapy, and anti-inflammatory  medicines. If another health problem is the cause, then that must also be treated. More testing may be needed if your symptoms dont get better.  Home care  · If your healthcare provider has prescribed medicines, take all of them as directed.  · You may use over-the-counter pain medicine to control pain, unless another medicine was prescribed. If prednisone was prescribed, dont use NSAIDs, or nonsteroidal anti-inflammatory drugs, such as ibuprofen or naproxen. Talk with your provider before using this medicine if you have chronic liver or kidney disease or ever had a stomach ulcer or GI bleeding.  · If you were referred to physical therapy, make an appointment. Be sure to do any prescribed exercises.  · Dont smoke. Smoking reduces blood flow to the inflamed area. This makes it harder to treat.  Follow-up care  Follow up with your healthcare provider, or as advised.  If you had an X-ray or an MRI, you will be notified of any new findings that may affect your care.  When to seek medical advice  Contact your healthcare provider right away if any of these occur:  · Increasing low back pain  · Inflammation of the eyes  · Skin rash or redness  · Weakness or numbness in one or both legs  · Loss of bowel or bladder control  · Numbness in the groin area  Date Last Reviewed: 11/24/2015  © 7585-8057 [email or web address] [address] [place] [address], [place] [address]. All rights reserved. This information is not intended as a substitute for professional medical care. Always follow your healthcare professional's instructions.

## 2019-08-02 ENCOUNTER — PATIENT MESSAGE (OUTPATIENT)
Dept: OBSTETRICS AND GYNECOLOGY | Facility: CLINIC | Age: 39
End: 2019-08-02

## 2019-08-07 RX ORDER — TRAZODONE HYDROCHLORIDE 50 MG/1
TABLET ORAL
Qty: 90 TABLET | Refills: 1 | Status: SHIPPED | OUTPATIENT
Start: 2019-08-07 | End: 2019-10-18

## 2019-08-08 RX ORDER — TRAZODONE HYDROCHLORIDE 50 MG/1
50 TABLET ORAL NIGHTLY
Qty: 90 TABLET | Refills: 1 | OUTPATIENT
Start: 2019-08-08

## 2019-08-18 RX ORDER — CITALOPRAM 20 MG/1
TABLET, FILM COATED ORAL
Qty: 90 TABLET | Refills: 1 | Status: SHIPPED | OUTPATIENT
Start: 2019-08-18 | End: 2019-08-19 | Stop reason: SDUPTHER

## 2019-08-19 RX ORDER — CITALOPRAM 20 MG/1
20 TABLET, FILM COATED ORAL DAILY
Qty: 90 TABLET | Refills: 1 | Status: SHIPPED | OUTPATIENT
Start: 2019-08-19 | End: 2020-08-17

## 2019-10-18 ENCOUNTER — OFFICE VISIT (OUTPATIENT)
Dept: FAMILY MEDICINE | Facility: CLINIC | Age: 39
End: 2019-10-18
Payer: COMMERCIAL

## 2019-10-18 VITALS
DIASTOLIC BLOOD PRESSURE: 80 MMHG | SYSTOLIC BLOOD PRESSURE: 128 MMHG | HEIGHT: 67 IN | WEIGHT: 196.44 LBS | BODY MASS INDEX: 30.83 KG/M2 | TEMPERATURE: 98 F | HEART RATE: 87 BPM | OXYGEN SATURATION: 98 %

## 2019-10-18 DIAGNOSIS — F33.0 MILD EPISODE OF RECURRENT MAJOR DEPRESSIVE DISORDER: ICD-10-CM

## 2019-10-18 DIAGNOSIS — G43.919 INTRACTABLE MIGRAINE WITHOUT STATUS MIGRAINOSUS, UNSPECIFIED MIGRAINE TYPE: Primary | ICD-10-CM

## 2019-10-18 DIAGNOSIS — Z71.6 ENCOUNTER FOR SMOKING CESSATION COUNSELING: ICD-10-CM

## 2019-10-18 DIAGNOSIS — G47.00 INSOMNIA, UNSPECIFIED TYPE: ICD-10-CM

## 2019-10-18 DIAGNOSIS — Z72.0 TOBACCO ABUSE: ICD-10-CM

## 2019-10-18 DIAGNOSIS — D75.839 THROMBOCYTOSIS: ICD-10-CM

## 2019-10-18 PROCEDURE — 99999 PR PBB SHADOW E&M-EST. PATIENT-LVL III: ICD-10-PCS | Mod: PBBFAC,,, | Performed by: INTERNAL MEDICINE

## 2019-10-18 PROCEDURE — 3008F BODY MASS INDEX DOCD: CPT | Mod: CPTII,S$GLB,, | Performed by: INTERNAL MEDICINE

## 2019-10-18 PROCEDURE — 99999 PR PBB SHADOW E&M-EST. PATIENT-LVL III: CPT | Mod: PBBFAC,,, | Performed by: INTERNAL MEDICINE

## 2019-10-18 PROCEDURE — 99214 PR OFFICE/OUTPT VISIT, EST, LEVL IV, 30-39 MIN: ICD-10-PCS | Mod: S$GLB,,, | Performed by: INTERNAL MEDICINE

## 2019-10-18 PROCEDURE — 3008F PR BODY MASS INDEX (BMI) DOCUMENTED: ICD-10-PCS | Mod: CPTII,S$GLB,, | Performed by: INTERNAL MEDICINE

## 2019-10-18 PROCEDURE — 99214 OFFICE O/P EST MOD 30 MIN: CPT | Mod: S$GLB,,, | Performed by: INTERNAL MEDICINE

## 2019-10-18 RX ORDER — AMITRIPTYLINE HYDROCHLORIDE 25 MG/1
25 TABLET, FILM COATED ORAL NIGHTLY
Qty: 30 TABLET | Refills: 0 | Status: SHIPPED | OUTPATIENT
Start: 2019-10-18 | End: 2019-11-05 | Stop reason: SDUPTHER

## 2019-10-18 RX ORDER — TOPIRAMATE 25 MG/1
25 TABLET ORAL DAILY
Qty: 30 TABLET | Refills: 0 | Status: SHIPPED | OUTPATIENT
Start: 2019-10-18 | End: 2019-10-18

## 2019-10-18 NOTE — PROGRESS NOTES
SUBJECTIVE     Chief Complaint   Patient presents with    Follow-up    Migraine       HPI  Claribel France is a 39 y.o. female with multiple medical diagnoses as listed in the medical history and problem list that presents for evaluation of migraines since 10/1/19. Pt reports her menses started 10/5/19. Her migraine is a stabbing/shooting at a 10/10 and constant in nature for 2-3 days then gradually improves. Pt reports pain starts at the R temporal lobe and radiates across to the L temple. 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 +N/V, photophobia, and phonophobia.     PAST MEDICAL HISTORY:  Past Medical History:   Diagnosis Date    Depression        PAST SURGICAL HISTORY:  Past Surgical History:   Procedure Laterality Date    BACK SURGERY         SOCIAL HISTORY:  Social History     Socioeconomic History    Marital status:      Spouse name: Not on file    Number of children: Not on file    Years of education: Not on file    Highest education level: Not on file   Occupational History    Not on file   Social Needs    Financial resource strain: Not hard at all    Food insecurity:     Worry: Never true     Inability: Never true    Transportation needs:     Medical: No     Non-medical: No   Tobacco Use    Smoking status: Current Every Day Smoker     Packs/day: 0.25     Types: Cigarettes    Smokeless tobacco: Current User   Substance and Sexual Activity    Alcohol use: Yes     Frequency: 2-3 times a week     Drinks per session: 1 or 2     Binge frequency: Less than monthly     Comment: weekends (wine)    Drug use: No    Sexual activity: Yes     Partners: Female   Lifestyle    Physical activity:     Days per week: 4 days     Minutes per session: 40 min    Stress: Rather much   Relationships    Social connections:     Talks on phone: Twice a week     Gets together: Twice a week     Attends Church service: Not on file     Active member  of club or organization: No     Attends meetings of clubs or organizations: Never     Relationship status:    Other Topics Concern    Not on file   Social History Narrative    Not on file       FAMILY HISTORY:  Family History   Problem Relation Age of Onset    Depression Mother     Asthma Mother     Hypertension Father     Diabetes type II Father        ALLERGIES AND MEDICATIONS: updated and reviewed.  Review of patient's allergies indicates:   Allergen Reactions    Sulfa (sulfonamide antibiotics) Rash     Current Outpatient Medications   Medication Sig Dispense Refill    butalbital-acetaminophen-caffeine -40 mg (FIORICET, ESGIC) -40 mg per tablet Take 1 tablet by mouth every 6 (six) hours as needed for Headaches. 16 tablet 0    citalopram (CELEXA) 20 MG tablet Take 1 tablet (20 mg total) by mouth once daily. 90 tablet 1    loratadine (CLARITIN) 10 mg tablet Take 10 mg by mouth once daily.  1    naproxen (NAPROSYN) 500 MG tablet Take 1 tablet (500 mg total) by mouth 2 (two) times daily with meals. 60 tablet 2    promethazine (PHENERGAN) 25 MG tablet Take 1 tablet (25 mg total) by mouth every 6 (six) hours as needed for Nausea. 12 tablet 0    tiZANidine (ZANAFLEX) 4 MG tablet Take 1 tablet (4 mg total) by mouth every 8 (eight) hours. 20 tablet 0    amitriptyline (ELAVIL) 25 MG tablet Take 1 tablet (25 mg total) by mouth every evening. 30 tablet 0     No current facility-administered medications for this visit.        ROS  Review of Systems   Constitutional: Negative for activity change and unexpected weight change.   HENT: Negative for hearing loss, rhinorrhea and trouble swallowing.    Eyes: Positive for visual disturbance. Negative for discharge.   Respiratory: Negative for chest tightness and wheezing.    Cardiovascular: Negative for chest pain and palpitations.   Gastrointestinal: Positive for nausea and vomiting. Negative for blood in stool, constipation and diarrhea.  "  Endocrine: Negative for polydipsia and polyuria.   Genitourinary: Positive for menstrual problem. Negative for difficulty urinating, dysuria and hematuria.   Musculoskeletal: Negative for arthralgias, joint swelling and neck pain.   Skin: Negative for rash and wound.   Neurological: Positive for headaches. Negative for weakness.   Psychiatric/Behavioral: Negative for confusion and dysphoric mood.         OBJECTIVE     Physical Exam  Vitals:    10/18/19 1343   BP: 128/80   Pulse: 87   Temp: 98.4 °F (36.9 °C)    Body mass index is 30.77 kg/m².  Weight: 89.1 kg (196 lb 6.9 oz)   Height: 5' 7" (170.2 cm)     Physical Exam   Constitutional: She is oriented to person, place, and time. She appears well-developed and well-nourished. No distress.   HENT:   Head: Normocephalic and atraumatic.   Right Ear: External ear normal.   Left Ear: External ear normal.   Nose: Nose normal.   Mouth/Throat: Oropharynx is clear and moist.   Eyes: Conjunctivae and EOM are normal. Right eye exhibits no discharge. Left eye exhibits no discharge. No scleral icterus.   Neck: Normal range of motion. Neck supple. No JVD present. No tracheal deviation present.   Pulmonary/Chest: Effort normal. No respiratory distress.   Musculoskeletal: Normal range of motion. She exhibits no deformity.   Neurological: She is alert and oriented to person, place, and time. She exhibits normal muscle tone. Coordination normal.   Skin: Skin is warm and dry. No rash noted. No erythema.   Psychiatric: She has a normal mood and affect. Her behavior is normal. Judgment and thought content normal.         Health Maintenance       Date Due Completion Date    TETANUS VACCINE 03/22/1998 ---    Pneumococcal Vaccine (Medium Risk) (1 of 1 - PPSV23) 03/22/1999 ---    Influenza Vaccine (1) 09/01/2019 ---    Pap Smear with HPV Cotest 02/15/2024 2/15/2019            ASSESSMENT     39 y.o. female with     1. Intractable migraine without status migrainosus, unspecified migraine " type    2. Mild episode of recurrent major depressive disorder    3. Insomnia, unspecified type    4. Tobacco abuse    5. Encounter for smoking cessation counseling    6. Thrombocytosis        PLAN:     1. Intractable migraine without status migrainosus, unspecified migraine type  - Start trial Elavil  - amitriptyline (ELAVIL) 25 MG tablet; Take 1 tablet (25 mg total) by mouth every evening.  Dispense: 30 tablet; Refill: 0    2. Mild episode of recurrent major depressive disorder  - Pt reports not well controlled, so will see it improves with addition of Elavil  - amitriptyline (ELAVIL) 25 MG tablet; Take 1 tablet (25 mg total) by mouth every evening.  Dispense: 30 tablet; Refill: 0    3. Insomnia, unspecified type  - Pt reports Trazodone has decreased her sex drive, so will discontinue and use Elavil  - amitriptyline (ELAVIL) 25 MG tablet; Take 1 tablet (25 mg total) by mouth every evening.  Dispense: 30 tablet; Refill: 0    4. Tobacco abuse  - Pt offered, but refused smoking cessation program  - Discussed possible use of Chantix, Bupropion, or nicotine patches/gum, but pt would like to think about it before deciding    5. Encounter for smoking cessation counseling  - As above    6. Thrombocytosis  - Stable; no acute issues  - Monitor        RTC in 4 weeks for repeat assessment of current treatment plan       María Elena Garcia MD  10/18/2019 1:51 PM        No follow-ups on file.

## 2019-10-24 ENCOUNTER — PATIENT MESSAGE (OUTPATIENT)
Dept: FAMILY MEDICINE | Facility: CLINIC | Age: 39
End: 2019-10-24

## 2019-10-24 NOTE — TELEPHONE ENCOUNTER
Last Office Visit Info:   The patient's last visit with María Elena Garcia MD was on 10/18/2019.    The patient's last visit in current department was on 10/18/2019.        Last CBC Results:   Lab Results   Component Value Date    WBC 4.06 01/15/2019    HGB 9.8 (L) 01/15/2019    HCT 31.3 (L) 01/15/2019     (H) 01/15/2019       Last CMP Results  Lab Results   Component Value Date     01/15/2019    K 4.6 01/15/2019     01/15/2019    CO2 28 01/15/2019    BUN 9 01/15/2019    CREATININE 0.8 01/15/2019    CALCIUM 9.3 01/15/2019    ALBUMIN 3.9 01/15/2019    AST 15 01/15/2019    ALT 11 01/15/2019       Last Lipids  Lab Results   Component Value Date    CHOL 307 (H) 01/15/2019    TRIG 64 01/15/2019    HDL 89 (H) 01/15/2019    LDLCALC 205.2 (H) 01/15/2019       Last A1C  Lab Results   Component Value Date    HGBA1C 5.4 01/15/2019       Last TSH  Lab Results   Component Value Date    TSH 0.435 01/15/2019         Current Med Refills  Medication List with Changes/Refills   Current Medications    AMITRIPTYLINE (ELAVIL) 25 MG TABLET    Take 1 tablet (25 mg total) by mouth every evening.       Start Date: 10/18/2019End Date: 10/17/2020    BUTALBITAL-ACETAMINOPHEN-CAFFEINE -40 MG (FIORICET, ESGIC) -40 MG PER TABLET    Take 1 tablet by mouth every 6 (six) hours as needed for Headaches.       Start Date: 5/2/2019  End Date: --    CITALOPRAM (CELEXA) 20 MG TABLET    Take 1 tablet (20 mg total) by mouth once daily.       Start Date: 8/19/2019 End Date: --    LORATADINE (CLARITIN) 10 MG TABLET    Take 10 mg by mouth once daily.       Start Date: 1/7/2019  End Date: --    NAPROXEN (NAPROSYN) 500 MG TABLET    Take 1 tablet (500 mg total) by mouth 2 (two) times daily with meals.       Start Date: 6/10/2019 End Date: 6/9/2020    PROMETHAZINE (PHENERGAN) 25 MG TABLET    Take 1 tablet (25 mg total) by mouth every 6 (six) hours as needed for Nausea.       Start Date: 5/2/2019  End Date: --    TIZANIDINE (ZANAFLEX) 4  MG TABLET    Take 1 tablet (4 mg total) by mouth every 8 (eight) hours.       Start Date: 6/10/2019 End Date: --

## 2019-11-04 ENCOUNTER — PATIENT MESSAGE (OUTPATIENT)
Dept: FAMILY MEDICINE | Facility: CLINIC | Age: 39
End: 2019-11-04

## 2019-11-04 DIAGNOSIS — G43.919 INTRACTABLE MIGRAINE WITHOUT STATUS MIGRAINOSUS, UNSPECIFIED MIGRAINE TYPE: ICD-10-CM

## 2019-11-04 DIAGNOSIS — F33.0 MILD EPISODE OF RECURRENT MAJOR DEPRESSIVE DISORDER: ICD-10-CM

## 2019-11-04 DIAGNOSIS — G47.00 INSOMNIA, UNSPECIFIED TYPE: ICD-10-CM

## 2019-11-05 RX ORDER — AMITRIPTYLINE HYDROCHLORIDE 25 MG/1
25 TABLET, FILM COATED ORAL NIGHTLY
Qty: 90 TABLET | Refills: 1 | Status: SHIPPED | OUTPATIENT
Start: 2019-11-05 | End: 2020-01-10

## 2020-01-10 ENCOUNTER — OFFICE VISIT (OUTPATIENT)
Dept: FAMILY MEDICINE | Facility: CLINIC | Age: 40
End: 2020-01-10
Payer: COMMERCIAL

## 2020-01-10 ENCOUNTER — LAB VISIT (OUTPATIENT)
Dept: LAB | Facility: HOSPITAL | Age: 40
End: 2020-01-10
Payer: COMMERCIAL

## 2020-01-10 VITALS
HEIGHT: 66 IN | DIASTOLIC BLOOD PRESSURE: 88 MMHG | BODY MASS INDEX: 34.72 KG/M2 | WEIGHT: 216.06 LBS | HEART RATE: 90 BPM | TEMPERATURE: 99 F | OXYGEN SATURATION: 99 % | SYSTOLIC BLOOD PRESSURE: 130 MMHG

## 2020-01-10 DIAGNOSIS — D75.839 THROMBOCYTOSIS: ICD-10-CM

## 2020-01-10 DIAGNOSIS — M25.562 ACUTE PAIN OF LEFT KNEE: ICD-10-CM

## 2020-01-10 DIAGNOSIS — R35.89 POLYURIA: ICD-10-CM

## 2020-01-10 DIAGNOSIS — R63.5 WEIGHT GAIN: ICD-10-CM

## 2020-01-10 DIAGNOSIS — D64.9 NORMOCYTIC ANEMIA: ICD-10-CM

## 2020-01-10 DIAGNOSIS — R63.1 POLYDIPSIA: ICD-10-CM

## 2020-01-10 DIAGNOSIS — G47.00 INSOMNIA, UNSPECIFIED TYPE: ICD-10-CM

## 2020-01-10 DIAGNOSIS — E78.00 PURE HYPERCHOLESTEROLEMIA: ICD-10-CM

## 2020-01-10 DIAGNOSIS — G43.919 INTRACTABLE MIGRAINE WITHOUT STATUS MIGRAINOSUS, UNSPECIFIED MIGRAINE TYPE: Primary | ICD-10-CM

## 2020-01-10 DIAGNOSIS — G43.919 INTRACTABLE MIGRAINE WITHOUT STATUS MIGRAINOSUS, UNSPECIFIED MIGRAINE TYPE: ICD-10-CM

## 2020-01-10 DIAGNOSIS — Z12.31 ENCOUNTER FOR SCREENING MAMMOGRAM FOR BREAST CANCER: ICD-10-CM

## 2020-01-10 DIAGNOSIS — F33.0 MILD EPISODE OF RECURRENT MAJOR DEPRESSIVE DISORDER: ICD-10-CM

## 2020-01-10 DIAGNOSIS — M23.8X2 CREPITUS OF BOTH KNEE JOINTS: ICD-10-CM

## 2020-01-10 DIAGNOSIS — M23.8X1 CREPITUS OF BOTH KNEE JOINTS: ICD-10-CM

## 2020-01-10 LAB
ALBUMIN SERPL BCP-MCNC: 3.9 G/DL (ref 3.5–5.2)
ALP SERPL-CCNC: 82 U/L (ref 55–135)
ALT SERPL W/O P-5'-P-CCNC: 21 U/L (ref 10–44)
ANION GAP SERPL CALC-SCNC: 7 MMOL/L (ref 8–16)
AST SERPL-CCNC: 22 U/L (ref 10–40)
BASOPHILS # BLD AUTO: 0.02 K/UL (ref 0–0.2)
BASOPHILS NFR BLD: 0.5 % (ref 0–1.9)
BILIRUB SERPL-MCNC: 0.3 MG/DL (ref 0.1–1)
BUN SERPL-MCNC: 10 MG/DL (ref 6–20)
CALCIUM SERPL-MCNC: 9.5 MG/DL (ref 8.7–10.5)
CHLORIDE SERPL-SCNC: 104 MMOL/L (ref 95–110)
CHOLEST SERPL-MCNC: 342 MG/DL (ref 120–199)
CHOLEST/HDLC SERPL: 4.4 {RATIO} (ref 2–5)
CO2 SERPL-SCNC: 25 MMOL/L (ref 23–29)
CREAT SERPL-MCNC: 0.7 MG/DL (ref 0.5–1.4)
DIFFERENTIAL METHOD: ABNORMAL
EOSINOPHIL # BLD AUTO: 0.1 K/UL (ref 0–0.5)
EOSINOPHIL NFR BLD: 2.3 % (ref 0–8)
ERYTHROCYTE [DISTWIDTH] IN BLOOD BY AUTOMATED COUNT: 17.1 % (ref 11.5–14.5)
EST. GFR  (AFRICAN AMERICAN): >60 ML/MIN/1.73 M^2
EST. GFR  (NON AFRICAN AMERICAN): >60 ML/MIN/1.73 M^2
FERRITIN SERPL-MCNC: 8 NG/ML (ref 20–300)
GLUCOSE SERPL-MCNC: 105 MG/DL (ref 70–110)
HCT VFR BLD AUTO: 33.5 % (ref 37–48.5)
HDLC SERPL-MCNC: 77 MG/DL (ref 40–75)
HDLC SERPL: 22.5 % (ref 20–50)
HGB BLD-MCNC: 10.3 G/DL (ref 12–16)
IMM GRANULOCYTES # BLD AUTO: 0.01 K/UL (ref 0–0.04)
IMM GRANULOCYTES NFR BLD AUTO: 0.3 % (ref 0–0.5)
IRON SERPL-MCNC: 37 UG/DL (ref 30–160)
LDLC SERPL CALC-MCNC: 232.6 MG/DL (ref 63–159)
LYMPHOCYTES # BLD AUTO: 1.3 K/UL (ref 1–4.8)
LYMPHOCYTES NFR BLD: 33.5 % (ref 18–48)
MCH RBC QN AUTO: 26.4 PG (ref 27–31)
MCHC RBC AUTO-ENTMCNC: 30.7 G/DL (ref 32–36)
MCV RBC AUTO: 86 FL (ref 82–98)
MONOCYTES # BLD AUTO: 0.2 K/UL (ref 0.3–1)
MONOCYTES NFR BLD: 6 % (ref 4–15)
NEUTROPHILS # BLD AUTO: 2.2 K/UL (ref 1.8–7.7)
NEUTROPHILS NFR BLD: 57.4 % (ref 38–73)
NONHDLC SERPL-MCNC: 265 MG/DL
NRBC BLD-RTO: 0 /100 WBC
PLATELET # BLD AUTO: 343 K/UL (ref 150–350)
PMV BLD AUTO: 9.5 FL (ref 9.2–12.9)
POTASSIUM SERPL-SCNC: 4.3 MMOL/L (ref 3.5–5.1)
PROT SERPL-MCNC: 8.3 G/DL (ref 6–8.4)
RBC # BLD AUTO: 3.9 M/UL (ref 4–5.4)
SATURATED IRON: 6 % (ref 20–50)
SODIUM SERPL-SCNC: 136 MMOL/L (ref 136–145)
TOTAL IRON BINDING CAPACITY: 632 UG/DL (ref 250–450)
TRANSFERRIN SERPL-MCNC: 427 MG/DL (ref 200–375)
TRIGL SERPL-MCNC: 162 MG/DL (ref 30–150)
TSH SERPL DL<=0.005 MIU/L-ACNC: 0.41 UIU/ML (ref 0.4–4)
WBC # BLD AUTO: 3.85 K/UL (ref 3.9–12.7)

## 2020-01-10 PROCEDURE — 85025 COMPLETE CBC W/AUTO DIFF WBC: CPT

## 2020-01-10 PROCEDURE — 82728 ASSAY OF FERRITIN: CPT

## 2020-01-10 PROCEDURE — 3008F BODY MASS INDEX DOCD: CPT | Mod: CPTII,S$GLB,, | Performed by: INTERNAL MEDICINE

## 2020-01-10 PROCEDURE — 83540 ASSAY OF IRON: CPT

## 2020-01-10 PROCEDURE — 84443 ASSAY THYROID STIM HORMONE: CPT

## 2020-01-10 PROCEDURE — 80053 COMPREHEN METABOLIC PANEL: CPT

## 2020-01-10 PROCEDURE — 99999 PR PBB SHADOW E&M-EST. PATIENT-LVL III: CPT | Mod: PBBFAC,,, | Performed by: INTERNAL MEDICINE

## 2020-01-10 PROCEDURE — 3008F PR BODY MASS INDEX (BMI) DOCUMENTED: ICD-10-PCS | Mod: CPTII,S$GLB,, | Performed by: INTERNAL MEDICINE

## 2020-01-10 PROCEDURE — 99214 OFFICE O/P EST MOD 30 MIN: CPT | Mod: S$GLB,,, | Performed by: INTERNAL MEDICINE

## 2020-01-10 PROCEDURE — 82306 VITAMIN D 25 HYDROXY: CPT

## 2020-01-10 PROCEDURE — 82746 ASSAY OF FOLIC ACID SERUM: CPT

## 2020-01-10 PROCEDURE — 83036 HEMOGLOBIN GLYCOSYLATED A1C: CPT

## 2020-01-10 PROCEDURE — 99999 PR PBB SHADOW E&M-EST. PATIENT-LVL III: ICD-10-PCS | Mod: PBBFAC,,, | Performed by: INTERNAL MEDICINE

## 2020-01-10 PROCEDURE — 99214 PR OFFICE/OUTPT VISIT, EST, LEVL IV, 30-39 MIN: ICD-10-PCS | Mod: S$GLB,,, | Performed by: INTERNAL MEDICINE

## 2020-01-10 PROCEDURE — 80061 LIPID PANEL: CPT

## 2020-01-10 PROCEDURE — 82607 VITAMIN B-12: CPT

## 2020-01-10 RX ORDER — AMITRIPTYLINE HYDROCHLORIDE 50 MG/1
50 TABLET, FILM COATED ORAL NIGHTLY
Qty: 90 TABLET | Refills: 1
Start: 2020-01-10 | End: 2020-05-07 | Stop reason: SDUPTHER

## 2020-01-10 RX ORDER — RAMELTEON 8 MG/1
8 TABLET ORAL NIGHTLY
Qty: 30 TABLET | Refills: 0 | Status: SHIPPED | OUTPATIENT
Start: 2020-01-10 | End: 2020-01-24

## 2020-01-10 NOTE — PROGRESS NOTES
SUBJECTIVE     No chief complaint on file.      MEHUL France is a 39 y.o. female with multiple medical diagnoses as listed in the medical history and problem list that presents for follow-up for migraines since 10/1/19. Pt reports her migraines are better controlled with Elavil. She reports the medication still does not help with her insomnia even with her taking 2 tabs nightly. She has not had a migraine since she has started the Elavil, but she has had some tension-type headaches. Pt does report some L knee pain since starting the Elavil. The knee pain is throbbing at a 7-8/10 and constant in nature and associated with swelling. Pt has been taking Naproxen with relief of pain. She denies any preceding trauma, falls, or heavy lifting. Of note, pt still undergoing Botox injections per dentist for migraines.     PAST MEDICAL HISTORY:  Past Medical History:   Diagnosis Date    Depression        PAST SURGICAL HISTORY:  Past Surgical History:   Procedure Laterality Date    BACK SURGERY         SOCIAL HISTORY:  Social History     Socioeconomic History    Marital status:      Spouse name: Not on file    Number of children: Not on file    Years of education: Not on file    Highest education level: Not on file   Occupational History    Not on file   Social Needs    Financial resource strain: Not hard at all    Food insecurity:     Worry: Never true     Inability: Never true    Transportation needs:     Medical: No     Non-medical: No   Tobacco Use    Smoking status: Current Every Day Smoker     Packs/day: 0.25     Types: Cigarettes    Smokeless tobacco: Current User   Substance and Sexual Activity    Alcohol use: Yes     Frequency: 2-3 times a week     Drinks per session: 1 or 2     Binge frequency: Less than monthly     Comment: weekends (wine)    Drug use: No    Sexual activity: Yes     Partners: Female   Lifestyle    Physical activity:     Days per week: 4 days     Minutes per session: 40  min    Stress: Rather much   Relationships    Social connections:     Talks on phone: Twice a week     Gets together: Twice a week     Attends Quaker service: Not on file     Active member of club or organization: No     Attends meetings of clubs or organizations: Never     Relationship status:    Other Topics Concern    Not on file   Social History Narrative    Not on file       FAMILY HISTORY:  Family History   Problem Relation Age of Onset    Depression Mother     Asthma Mother     Hypertension Father     Diabetes type II Father        ALLERGIES AND MEDICATIONS: updated and reviewed.  Review of patient's allergies indicates:   Allergen Reactions    Sulfa (sulfonamide antibiotics) Rash     Current Outpatient Medications   Medication Sig Dispense Refill    amitriptyline (ELAVIL) 50 MG tablet Take 1 tablet (50 mg total) by mouth every evening. 90 tablet 1    butalbital-acetaminophen-caffeine -40 mg (FIORICET, ESGIC) -40 mg per tablet Take 1 tablet by mouth every 6 (six) hours as needed for Headaches. 16 tablet 0    citalopram (CELEXA) 20 MG tablet Take 1 tablet (20 mg total) by mouth once daily. 90 tablet 1    loratadine (CLARITIN) 10 mg tablet Take 10 mg by mouth once daily.  1    naproxen (NAPROSYN) 500 MG tablet Take 1 tablet (500 mg total) by mouth 2 (two) times daily with meals. 60 tablet 2    promethazine (PHENERGAN) 25 MG tablet Take 1 tablet (25 mg total) by mouth every 6 (six) hours as needed for Nausea. 12 tablet 0    ramelteon (ROZEREM) 8 mg tablet Take 1 tablet (8 mg total) by mouth every evening. 30 tablet 0    tiZANidine (ZANAFLEX) 4 MG tablet Take 1 tablet (4 mg total) by mouth every 8 (eight) hours. 20 tablet 0     No current facility-administered medications for this visit.        ROS  Review of Systems   Constitutional: Positive for unexpected weight change. Negative for activity change.   HENT: Negative for hearing loss, rhinorrhea and trouble swallowing.   "  Eyes: Negative for discharge and visual disturbance.   Respiratory: Negative for chest tightness and wheezing.    Cardiovascular: Negative for chest pain and palpitations.   Gastrointestinal: Negative for blood in stool, constipation, diarrhea and vomiting.   Endocrine: Positive for polydipsia and polyuria.   Genitourinary: Negative for difficulty urinating, dysuria, hematuria and menstrual problem.   Musculoskeletal: Positive for arthralgias (L knee pain) and joint swelling (L knee). Negative for neck pain.   Skin: Negative for rash and wound.   Neurological: Negative for weakness and headaches.   Psychiatric/Behavioral: Positive for dysphoric mood. Negative for confusion.         OBJECTIVE     Physical Exam  Vitals:    01/10/20 1138   BP: 130/88   Pulse: 90   Temp: 98.8 °F (37.1 °C)    Body mass index is 34.87 kg/m².  Weight: 98 kg (216 lb 0.8 oz)   Height: 5' 6" (167.6 cm)     Physical Exam   Constitutional: She is oriented to person, place, and time. She appears well-developed and well-nourished. No distress.   HENT:   Head: Normocephalic and atraumatic.   Right Ear: Hearing, tympanic membrane and external ear normal.   Left Ear: Hearing, tympanic membrane and external ear normal.   Nose: Nose normal. No rhinorrhea.   Mouth/Throat: Oropharynx is clear and moist. No uvula swelling. No posterior oropharyngeal edema or posterior oropharyngeal erythema.   R ear cerumen impaction   Eyes: Conjunctivae and EOM are normal. Right eye exhibits no discharge. Left eye exhibits no discharge. No scleral icterus.   Neck: Normal range of motion. Neck supple. No JVD present. No tracheal deviation present.   Cardiovascular: Normal rate, regular rhythm and intact distal pulses. Exam reveals no gallop and no friction rub.   No murmur heard.  Pulmonary/Chest: Effort normal and breath sounds normal. No respiratory distress. She has no wheezes.   Abdominal: Soft. Bowel sounds are normal. She exhibits no distension and no mass. There " is no tenderness. There is no rebound and no guarding.   Musculoskeletal: Normal range of motion. She exhibits no edema, tenderness or deformity.   B/L knee crepitus   Neurological: She is alert and oriented to person, place, and time. She exhibits normal muscle tone. Coordination normal.   Skin: Skin is warm and dry. No rash noted. No erythema.   Psychiatric: She has a normal mood and affect. Her behavior is normal. Judgment and thought content normal.         Health Maintenance       Date Due Completion Date    TETANUS VACCINE 03/22/1998 ---    Pneumococcal Vaccine (Medium Risk) (1 of 1 - PPSV23) 10/18/2020 (Originally 3/22/1999) ---    Influenza Vaccine (1) 10/18/2020 (Originally 9/1/2019) ---    Pap Smear with HPV Cotest 02/15/2024 2/15/2019            ASSESSMENT     39 y.o. female with     1. Intractable migraine without status migrainosus, unspecified migraine type    2. Insomnia, unspecified type    3. Acute pain of left knee    4. Crepitus of both knee joints    5. Thrombocytosis    6. Normocytic anemia    7. Pure hypercholesterolemia    8. Mild episode of recurrent major depressive disorder    9. Polyuria    10. Polydipsia    11. Weight gain    12. Encounter for screening mammogram for breast cancer        PLAN:     1. Intractable migraine without status migrainosus, unspecified migraine type  - Well controlled with Elavil; continue for now  - Comprehensive metabolic panel; Future  - CBC auto differential; Future  - TSH; Future  - amitriptyline (ELAVIL) 50 MG tablet; Take 1 tablet (50 mg total) by mouth every evening.  Dispense: 90 tablet; Refill: 1    2. Insomnia, unspecified type  - Will start trial Rozerem as Trazodone/Elavil are not effective   - TSH; Future  - ramelteon (ROZEREM) 8 mg tablet; Take 1 tablet (8 mg total) by mouth every evening.  Dispense: 30 tablet; Refill: 0    3. Acute pain of left knee  - Pt encouraged to apply ice packs 2-3 times daily at 10 minute intervals x 72 hours, then okay to  change to heating compress with care not to burn her self; she  voiced understanding   - Vitamin D; Future  - X-Ray Knee AP Standing Bilateral; Future    4. Crepitus of both knee joints  - X-Ray Knee AP Standing Bilateral; Future    5. Thrombocytosis  - CBC auto differential; Future    6. Normocytic anemia  - CBC auto differential; Future    7. Pure hypercholesterolemia  - Lipid panel; Future    8. Mild episode of recurrent major depressive disorder  - Stable; no acute issues  - The current medical regimen is effective;  continue present plan and medications.  - amitriptyline (ELAVIL) 50 MG tablet; Take 1 tablet (50 mg total) by mouth every evening.  Dispense: 90 tablet; Refill: 1    9. Polyuria  - r/o Type 2 DM  - Hemoglobin A1c; Future    10. Polydipsia  - Hemoglobin A1c; Future    11. Weight gain  - Hemoglobin A1c; Future    12. Encounter for screening mammogram for breast cancer  - Mammo Digital Screening Bilateral With CAD; Future        RTC in 2 weeks for repeat assessment of current treatment plan       María Elena Garcia MD  01/10/2020 11:17 AM        No follow-ups on file.

## 2020-01-11 LAB
25(OH)D3+25(OH)D2 SERPL-MCNC: 7 NG/ML (ref 30–96)
ESTIMATED AVG GLUCOSE: 117 MG/DL (ref 68–131)
FOLATE SERPL-MCNC: 8 NG/ML (ref 4–24)
HBA1C MFR BLD HPLC: 5.7 % (ref 4–5.6)
VIT B12 SERPL-MCNC: 351 PG/ML (ref 210–950)

## 2020-01-13 DIAGNOSIS — E78.2 MIXED HYPERLIPIDEMIA: Primary | ICD-10-CM

## 2020-01-13 DIAGNOSIS — E55.9 VITAMIN D DEFICIENCY: ICD-10-CM

## 2020-01-13 PROBLEM — D50.9 IDA (IRON DEFICIENCY ANEMIA): Status: ACTIVE | Noted: 2019-01-18

## 2020-01-13 PROBLEM — R73.03 PREDIABETES: Status: ACTIVE | Noted: 2020-01-13

## 2020-01-13 RX ORDER — ERGOCALCIFEROL 1.25 MG/1
50000 CAPSULE ORAL
Qty: 12 CAPSULE | Refills: 0 | Status: SHIPPED | OUTPATIENT
Start: 2020-01-13 | End: 2020-02-12

## 2020-01-13 RX ORDER — ROSUVASTATIN CALCIUM 40 MG/1
40 TABLET, COATED ORAL NIGHTLY
Qty: 90 TABLET | Refills: 3 | Status: SHIPPED | OUTPATIENT
Start: 2020-01-13 | End: 2021-05-31 | Stop reason: SDUPTHER

## 2020-01-24 ENCOUNTER — TELEPHONE (OUTPATIENT)
Dept: FAMILY MEDICINE | Facility: CLINIC | Age: 40
End: 2020-01-24

## 2020-01-24 DIAGNOSIS — G47.00 INSOMNIA, UNSPECIFIED TYPE: Primary | ICD-10-CM

## 2020-01-24 RX ORDER — ZOLPIDEM TARTRATE 5 MG/1
5 TABLET ORAL NIGHTLY PRN
Qty: 30 TABLET | Refills: 2 | Status: SHIPPED | OUTPATIENT
Start: 2020-01-24 | End: 2020-05-07 | Stop reason: SDUPTHER

## 2020-01-24 NOTE — TELEPHONE ENCOUNTER
Patient was prescribed ROZEREM 8MG, that is not a covered medication, the alternatives are as follows; Zolpidem Tartrate, Zaleplon, Eszopiclone,  Or Zolpidem Tartarate ER

## 2020-03-16 ENCOUNTER — OFFICE VISIT (OUTPATIENT)
Dept: PSYCHIATRY | Facility: CLINIC | Age: 40
End: 2020-03-16
Payer: COMMERCIAL

## 2020-03-16 DIAGNOSIS — F33.0 MDD (MAJOR DEPRESSIVE DISORDER), RECURRENT EPISODE, MILD: Primary | ICD-10-CM

## 2020-03-16 DIAGNOSIS — G47.00 INSOMNIA, UNSPECIFIED TYPE: ICD-10-CM

## 2020-03-16 PROCEDURE — 90791 PR PSYCHIATRIC DIAGNOSTIC EVALUATION: ICD-10-PCS | Mod: S$GLB,,, | Performed by: SOCIAL WORKER

## 2020-03-16 PROCEDURE — 90791 PSYCH DIAGNOSTIC EVALUATION: CPT | Mod: S$GLB,,, | Performed by: SOCIAL WORKER

## 2020-03-17 NOTE — PROGRESS NOTES
Psychiatry Initial Visit (PhD/LCSW)  Diagnostic Interview - CPT 16216    Date: 3/16/2020    Site: [facility]    Referral source: self-referral     Clinical status of patient: Outpatient    Claribel France, a 39 y.o. female, for initial evaluation visit.  Met with patient.    Chief complaint/reason for encounter: depression    History of present illness: 39 year old female patient presents to the clinic today for initial visit with chief complaint of depression.  She is tearful for the duration of session.  Reports that she and her wife are having conflict.  They are not sleeping in the same bed together.  They were once seeing a therapist for couples therapy, but this has stopped.  Patient reports that she has issues with insomnia.  She drinks alcohol daily.  Typically, she will drink 20 oz-32 oz daiquiri daily.  She drinks after she comes home from work, and sits on the couch to watch television.  Her job is stressful.  She is a surgical dental assistant.  Feels she is underpaid.  Moved to [place] in 2016, and this has continued to be an adjustment.  She has been engaged in therapy before.  Her primary care physician-Dr. Garcia ([facility]) is prescribing her Celexa and Ambien.  Her mother  in 2016, and she feels she has not fully grieved the loss of her mother.      Pain: noncontributory    Symptoms:   · Mood: depressed mood, diminished interest, weight gain, insomnia, fatigue, worthlessness/guilt, poor concentration, tearfulness and social isolation  · Anxiety: denied  · Substance abuse: great deal of time spent with substance and activities reduced  · Cognitive functioning: denied  · Health behaviors: noncontributory    Psychiatric history: psychotropic management by PCP and has participated in counseling/psychotherapy on an outpatient basis in the past    Medical history: noncontributory    Family history of psychiatric illness: patient's father-alcohol use d/o       Social history (marriage,  employment, etc.): Patient is .  One son (20).  Patient is originally from [place], [place].  Patient's mother is .  Patient's father resides in [place].  One brother, one sister.  High school graduate.  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      Substance use:   Alcohol: daily    Drugs: none   Tobacco: stopped smoking 1 month ago    Caffeine: coffee     Current medications and drug reactions (include OTC, herbal): see medication list     Strengths and liabilities: Strength: Patient accepts guidance/feedback, Strength: Patient is expressive/articulate., Strength: Patient is motivated for change., Strength: Patient has reasonable judgment., Liability: Patient lacks coping skills.    Current Evaluation:     Mental Status Exam:  General Appearance:  unremarkable, age appropriate   Speech: normal tone, normal rate, normal pitch, normal volume      Level of Cooperation: cooperative      Thought Processes: normal and logical   Mood: depressed      Thought Content: normal, no suicidality, no homicidality, delusions, or paranoia   Affect: appropriate   Orientation: Oriented x3   Memory: recent >  intact, remote >  intact   Attention Span & Concentration: intact   Fund of General Knowledge: intact and appropriate to age and level of education   Abstract Reasoning: not assessed    Judgment & Insight: intact     Language  intact     Diagnostic Impression - Plan:       ICD-10-CM ICD-9-CM   1. MDD (major depressive disorder), recurrent episode, mild F33.0 296.31   2. Insomnia, unspecified type G47.00 780.52       Plan:individual psychotherapy, consult psychiatrist for medication evaluation, medication management by physician, abstinence and BMU    Return to Clinic: as scheduled    Length of Service (minutes): 45

## 2020-04-28 ENCOUNTER — OFFICE VISIT (OUTPATIENT)
Dept: PSYCHIATRY | Facility: CLINIC | Age: 40
End: 2020-04-28
Payer: COMMERCIAL

## 2020-04-28 DIAGNOSIS — F10.20 ALCOHOL USE DISORDER, MODERATE, DEPENDENCE: Primary | ICD-10-CM

## 2020-04-28 DIAGNOSIS — G47.00 INSOMNIA, UNSPECIFIED TYPE: ICD-10-CM

## 2020-04-28 DIAGNOSIS — F33.0 MDD (MAJOR DEPRESSIVE DISORDER), RECURRENT EPISODE, MILD: ICD-10-CM

## 2020-04-28 PROCEDURE — 90834 PR PSYCHOTHERAPY W/PATIENT, 45 MIN: ICD-10-PCS | Mod: 95,,, | Performed by: SOCIAL WORKER

## 2020-04-28 PROCEDURE — 90834 PSYTX W PT 45 MINUTES: CPT | Mod: 95,,, | Performed by: SOCIAL WORKER

## 2020-05-01 ENCOUNTER — DOCUMENTATION ONLY (OUTPATIENT)
Dept: PSYCHIATRY | Facility: HOSPITAL | Age: 40
End: 2020-05-01

## 2020-05-01 NOTE — PSYCH
Nata received a referral for pt from Uche Garcia LCSW in regards to ABU. Referral to verify benefits was placed. Nata placed a call to pt to discuss program requirements as well as provide benefit info. No answer sw left a vm for immediate call back.

## 2020-05-03 DIAGNOSIS — G47.00 INSOMNIA, UNSPECIFIED TYPE: ICD-10-CM

## 2020-05-03 DIAGNOSIS — G43.919 INTRACTABLE MIGRAINE WITHOUT STATUS MIGRAINOSUS, UNSPECIFIED MIGRAINE TYPE: ICD-10-CM

## 2020-05-03 DIAGNOSIS — F33.0 MILD EPISODE OF RECURRENT MAJOR DEPRESSIVE DISORDER: ICD-10-CM

## 2020-05-05 RX ORDER — ZOLPIDEM TARTRATE 5 MG/1
TABLET ORAL
Qty: 30 TABLET | Refills: 0 | OUTPATIENT
Start: 2020-05-05

## 2020-05-05 RX ORDER — AMITRIPTYLINE HYDROCHLORIDE 25 MG/1
TABLET, FILM COATED ORAL
Qty: 90 TABLET | Refills: 0 | OUTPATIENT
Start: 2020-05-05

## 2020-05-06 ENCOUNTER — PATIENT MESSAGE (OUTPATIENT)
Dept: FAMILY MEDICINE | Facility: CLINIC | Age: 40
End: 2020-05-06

## 2020-05-07 ENCOUNTER — PATIENT MESSAGE (OUTPATIENT)
Dept: FAMILY MEDICINE | Facility: CLINIC | Age: 40
End: 2020-05-07

## 2020-05-07 ENCOUNTER — OFFICE VISIT (OUTPATIENT)
Dept: FAMILY MEDICINE | Facility: CLINIC | Age: 40
End: 2020-05-07
Payer: COMMERCIAL

## 2020-05-07 DIAGNOSIS — G47.00 INSOMNIA, UNSPECIFIED TYPE: ICD-10-CM

## 2020-05-07 DIAGNOSIS — F33.0 MILD EPISODE OF RECURRENT MAJOR DEPRESSIVE DISORDER: ICD-10-CM

## 2020-05-07 DIAGNOSIS — G43.919 INTRACTABLE MIGRAINE WITHOUT STATUS MIGRAINOSUS, UNSPECIFIED MIGRAINE TYPE: Primary | ICD-10-CM

## 2020-05-07 PROCEDURE — 99213 PR OFFICE/OUTPT VISIT, EST, LEVL III, 20-29 MIN: ICD-10-PCS | Mod: 95,,, | Performed by: PHYSICIAN ASSISTANT

## 2020-05-07 PROCEDURE — 99213 OFFICE O/P EST LOW 20 MIN: CPT | Mod: 95,,, | Performed by: PHYSICIAN ASSISTANT

## 2020-05-07 RX ORDER — AMITRIPTYLINE HYDROCHLORIDE 50 MG/1
50 TABLET, FILM COATED ORAL NIGHTLY
Qty: 90 TABLET | Refills: 1 | Status: SHIPPED | OUTPATIENT
Start: 2020-05-07 | End: 2020-11-03 | Stop reason: SDUPTHER

## 2020-05-07 RX ORDER — ZOLPIDEM TARTRATE 5 MG/1
5 TABLET ORAL NIGHTLY PRN
Qty: 30 TABLET | Refills: 2 | Status: SHIPPED | OUTPATIENT
Start: 2020-05-07 | End: 2020-08-04 | Stop reason: SDUPTHER

## 2020-05-07 RX ORDER — ZOLPIDEM TARTRATE 5 MG/1
5 TABLET ORAL NIGHTLY PRN
Qty: 30 TABLET | Refills: 2 | Status: CANCELLED | OUTPATIENT
Start: 2020-05-07 | End: 2020-11-05

## 2020-05-07 NOTE — PROGRESS NOTES
Subjective:       Patient ID: Claribel France is a 40 y.o. female with multiple medical diagnoses as listed in the medical history and problem list that presents for Medication Refill  .    Chief Complaint: Medication Refill      The patient location is: at home  The chief complaint leading to consultation is: refills  Visit type: Virtual visit with synchronous audio and video  Quality of audio: good  Quality of sound: good   Each patient to whom he or she provides medical services by telemedicine is:  (1) informed of the relationship between the physician and patient and the respective role of any other health care provider with respect to management of the patient; and (2) notified that he or she may decline to receive medical services by telemedicine and may withdraw from such care at any time.    HPI   Pt calling as she need refills on her medications: Ambien and Elavil.   She states both are working great. No side effects.   She wasn't aware the ambien required follow up.   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  Her mood is happy and she is smiling during the visit.     Review of Systems      PAST MEDICAL HISTORY:  Past Medical History:   Diagnosis Date    Depression        SOCIAL HISTORY:  Social History     Socioeconomic History    Marital status:      Spouse name: Not on file    Number of children: Not on file    Years of education: Not on file    Highest education level: Not on file   Occupational History    Not on file   Social Needs    Financial resource strain: Not hard at all    Food insecurity:     Worry: Never true     Inability: Never true    Transportation needs:     Medical: No     Non-medical: No   Tobacco Use    Smoking status: Current Every Day Smoker     Packs/day: 0.25     Types: Cigarettes    Smokeless tobacco: Current User   Substance and Sexual Activity    Alcohol use: Yes     Frequency: 2-3 times a week     Drinks per session: 1 or 2      Binge frequency: Less than monthly     Comment: weekends (wine)    Drug use: No    Sexual activity: Yes     Partners: Female   Lifestyle    Physical activity:     Days per week: 4 days     Minutes per session: 40 min    Stress: Rather much   Relationships    Social connections:     Talks on phone: Twice a week     Gets together: Twice a week     Attends Anabaptist service: Not on file     Active member of club or organization: No     Attends meetings of clubs or organizations: Never     Relationship status:    Other Topics Concern    Not on file   Social History Narrative    Not on file       ALLERGIES AND MEDICATIONS: updated and reviewed.  Review of patient's allergies indicates:   Allergen Reactions    Sulfa (sulfonamide antibiotics) Rash     Current Outpatient Medications   Medication Sig Dispense Refill    amitriptyline (ELAVIL) 50 MG tablet Take 1 tablet (50 mg total) by mouth every evening. 90 tablet 1    citalopram (CELEXA) 20 MG tablet Take 1 tablet (20 mg total) by mouth once daily. 90 tablet 1    loratadine (CLARITIN) 10 mg tablet Take 10 mg by mouth once daily.  1    rosuvastatin (CRESTOR) 40 MG Tab Take 1 tablet (40 mg total) by mouth every evening. 90 tablet 3    zolpidem (AMBIEN) 5 MG Tab Take 1 tablet (5 mg total) by mouth nightly as needed. 30 tablet 2    butalbital-acetaminophen-caffeine -40 mg (FIORICET, ESGIC) -40 mg per tablet Take 1 tablet by mouth every 6 (six) hours as needed for Headaches. (Patient not taking: Reported on 5/7/2020) 16 tablet 0    naproxen (NAPROSYN) 500 MG tablet Take 1 tablet (500 mg total) by mouth 2 (two) times daily with meals. 60 tablet 2    promethazine (PHENERGAN) 25 MG tablet Take 1 tablet (25 mg total) by mouth every 6 (six) hours as needed for Nausea. 12 tablet 0    tiZANidine (ZANAFLEX) 4 MG tablet Take 1 tablet (4 mg total) by mouth every 8 (eight) hours. 20 tablet 0     No current facility-administered medications for this  visit.          Objective:   There were no vitals taken for this visit.     Physical Exam   Constitutional: She is oriented to person, place, and time. No distress.   HENT:   Head: Normocephalic and atraumatic.   Neurological: She is alert and oriented to person, place, and time.   Psychiatric: She has a normal mood and affect. Her behavior is normal.           Assessment:       1. Intractable migraine without status migrainosus, unspecified migraine type    2. Mild episode of recurrent major depressive disorder        Plan:       Intractable migraine without status migrainosus, unspecified migraine type  -     amitriptyline (ELAVIL) 50 MG tablet; Take 1 tablet (50 mg total) by mouth every evening.  Dispense: 90 tablet; Refill: 1    Mild episode of recurrent major depressive disorder  -     amitriptyline (ELAVIL) 50 MG tablet; Take 1 tablet (50 mg total) by mouth every evening.  Dispense: 90 tablet; Refill: 1    Consult Start Time: 05/07/2020 15:04  Consult End Time: 05/07/2020 15:14      pt aware when her bottle says 0 refills for ambiem to schedule. Will send high priority to PCP and let pcp know when she would like her to follow up.           No follow-ups on file.

## 2020-05-07 NOTE — PROGRESS NOTES
Answers for HPI/ROS submitted by the patient on 5/7/2020   activity change: No  unexpected weight change: No  neck pain: No  hearing loss: No  rhinorrhea: No  trouble swallowing: No  eye discharge: No  visual disturbance: No  chest tightness: No  wheezing: No  chest pain: No  palpitations: No  blood in stool: No  constipation: No  vomiting: No  diarrhea: No  polydipsia: No  polyuria: No  difficulty urinating: No  hematuria: No  menstrual problem: No  dysuria: No  joint swelling: No  arthralgias: No  headaches: No  weakness: No  confusion: No  dysphoric mood: Yes

## 2020-05-07 NOTE — TELEPHONE ENCOUNTER
Spoke to pt via virtual today  Doing well on all meds  Did not realize she needed follow up for ambien. I told her 3-6 months she is aware now at last fill she will need to schedule. Could you fill a supply you want for her and message her with when you want her to have her next follow up for it? Thanks.

## 2020-05-12 ENCOUNTER — OFFICE VISIT (OUTPATIENT)
Dept: PSYCHIATRY | Facility: CLINIC | Age: 40
End: 2020-05-12
Payer: COMMERCIAL

## 2020-05-12 DIAGNOSIS — G47.00 INSOMNIA, UNSPECIFIED TYPE: ICD-10-CM

## 2020-05-12 DIAGNOSIS — F10.20 ALCOHOL USE DISORDER, SEVERE, DEPENDENCE: Primary | ICD-10-CM

## 2020-05-12 DIAGNOSIS — F33.0 MDD (MAJOR DEPRESSIVE DISORDER), RECURRENT EPISODE, MILD: ICD-10-CM

## 2020-05-12 PROCEDURE — 90834 PR PSYCHOTHERAPY W/PATIENT, 45 MIN: ICD-10-PCS | Mod: 95,,, | Performed by: SOCIAL WORKER

## 2020-05-12 PROCEDURE — 90834 PSYTX W PT 45 MINUTES: CPT | Mod: 95,,, | Performed by: SOCIAL WORKER

## 2020-05-13 NOTE — PROGRESS NOTES
Individual Psychotherapy (PhD/LCSW)    5/12/2020    Site:  [facility] is on-site; patient's location: her home.             Therapeutic Intervention: Met with patient.  Outpatient - Insight oriented psychotherapy 45 min - CPT code 24771 and Outpatient - Supportive psychotherapy 45 min - CPT Code 59450    Chief complaint/reason for encounter: addictive disorder, depression and interpersonal     Interval history and content of current session: Patient is present today for follow up virtual visit.  She is tearful today in session.  Continues to report feelings of depression.  She is drinking daily.  Reports that she cannot enroll in the [facility], due to conflict with her work schedule.  Informed patient that the ABU can complete FMLA for her.  Has returned to work, and has a stressful schedule.  She is travelling a lot.  Getting along a little bit better with her spouse.  Reports that her spouse is willing to engage in family therapy.  Encouraged patient to participate in virtual AA meetings.  Reports that she is willing to do so.  Discussed ways for patient to maintain better life balance.  She has an appointment scheduled with psychiatry in the clinic.                                                         Treatment plan:  · Target symptoms: alcohol abuse, depression, adjustment, work stress  · Why chosen therapy is appropriate versus another modality: relevant to diagnosis, patient responds to this modality  · Outcome monitoring methods: self-report, observation  · Therapeutic intervention type: insight oriented psychotherapy, supportive psychotherapy    Risk parameters:  Patient reports no suicidal ideation  Patient reports no homicidal ideation  Patient reports no self-injurious behavior  Patient reports no violent behavior    Verbal deficits: None    Patient's response to intervention:  The patient's response to intervention is a little reluctant .    Progress toward goals and other mental status  changes:  The patient's progress toward goals is limited.    Diagnosis:     ICD-10-CM ICD-9-CM   1. Alcohol use disorder, severe, dependence F10.20 303.90   2. MDD (major depressive disorder), recurrent episode, mild F33.0 296.31   3. Insomnia, unspecified type G47.00 780.52       Plan:  individual psychotherapy, family psychotherapy, consult psychiatrist for medication evaluation, AA, abstinence and ABU    Return to clinic: as scheduled    Length of Service (minutes): 45

## 2020-06-09 ENCOUNTER — OFFICE VISIT (OUTPATIENT)
Dept: PSYCHIATRY | Facility: CLINIC | Age: 40
End: 2020-06-09
Payer: COMMERCIAL

## 2020-06-09 DIAGNOSIS — G47.00 INSOMNIA, UNSPECIFIED TYPE: ICD-10-CM

## 2020-06-09 DIAGNOSIS — F33.0 MDD (MAJOR DEPRESSIVE DISORDER), RECURRENT EPISODE, MILD: ICD-10-CM

## 2020-06-09 DIAGNOSIS — F10.20 ALCOHOL USE DISORDER, SEVERE, DEPENDENCE: Primary | ICD-10-CM

## 2020-06-09 PROCEDURE — 90834 PR PSYCHOTHERAPY W/PATIENT, 45 MIN: ICD-10-PCS | Mod: 95,,, | Performed by: SOCIAL WORKER

## 2020-06-09 PROCEDURE — 90834 PSYTX W PT 45 MINUTES: CPT | Mod: 95,,, | Performed by: SOCIAL WORKER

## 2020-06-10 NOTE — PROGRESS NOTES
Individual Psychotherapy (PhD/LCSW)    6/9/2020    Site:  [facility] is on-site; patient's location: her home.                Therapeutic Intervention: Met with patient.  Outpatient - Insight oriented psychotherapy 45 min - CPT code 51870 and Outpatient - Supportive psychotherapy 45 min - CPT Code 65859    Chief complaint/reason for encounter: addictive disorder, depression and interpersonal     Interval history and content of current session: Patient is present for follow up virtual visit.  Reports that she left her home recently, following a dispute with her spouse.  She has since returned.  Her spouse participates briefly during session.  Her spouse reports that she is concerned about patient's reaction to things.  Particularly, when patient and her spouse have discussed the dynamics of patient's family.  Patient continues to drink alcohol daily.  She has yet to engage in a virtual AA meeting.  Remains resistant to enrolling in the ABU-[facility].  She is tearful intermittently during session.  Continue to encourage patient to enroll in the ABU and engage in virtual AA.      Treatment plan:  · Target symptoms: alcohol abuse, depression, adjustment, work stress  · Why chosen therapy is appropriate versus another modality: relevant to diagnosis, patient responds to this modality  · Outcome monitoring methods: self-report, observation  · Therapeutic intervention type: insight oriented psychotherapy, supportive psychotherapy    Risk parameters:  Patient reports no suicidal ideation  Patient reports no homicidal ideation  Patient reports no self-injurious behavior  Patient reports no violent behavior    Verbal deficits: None    Patient's response to intervention:  The patient's response to intervention is reluctant.    Progress toward goals and other mental status changes:  The patient's progress toward goals is limited.    Diagnosis:     ICD-10-CM ICD-9-CM   1. Alcohol use disorder, severe, dependence F10.20  303.90   2. MDD (major depressive disorder), recurrent episode, mild F33.0 296.31   3. Insomnia, unspecified type G47.00 780.52       Plan:  individual psychotherapy, family psychotherapy, consult psychiatrist for medication evaluation, AA, abstinence and ABU    Return to clinic: as scheduled    Length of Service (minutes): 45

## 2020-08-04 ENCOUNTER — OFFICE VISIT (OUTPATIENT)
Dept: FAMILY MEDICINE | Facility: CLINIC | Age: 40
End: 2020-08-04
Payer: COMMERCIAL

## 2020-08-04 DIAGNOSIS — G47.00 INSOMNIA, UNSPECIFIED TYPE: Primary | ICD-10-CM

## 2020-08-04 PROCEDURE — 99214 PR OFFICE/OUTPT VISIT, EST, LEVL IV, 30-39 MIN: ICD-10-PCS | Mod: 95,,, | Performed by: INTERNAL MEDICINE

## 2020-08-04 PROCEDURE — 99214 OFFICE O/P EST MOD 30 MIN: CPT | Mod: 95,,, | Performed by: INTERNAL MEDICINE

## 2020-08-04 RX ORDER — ZOLPIDEM TARTRATE 5 MG/1
5 TABLET ORAL NIGHTLY PRN
Qty: 30 TABLET | Refills: 2 | Status: SHIPPED | OUTPATIENT
Start: 2020-08-07 | End: 2020-11-03 | Stop reason: SDUPTHER

## 2020-08-04 NOTE — PROGRESS NOTES
Chief Complaint  Follow-up    HPI  Claribel France is a 40 y.o. female with multiple medical diagnoses as listed in the medical history and problem list that presents for follow-up.  Their last appointment with primary care was 5/7/2020.      She has no new concerns since her last visit. Her stress levels have been higher than usual at work. She talks with a counselor regularly, either in person or by video which helps.     No trouble with sleep. She sleeps for 8 hours and denies any AM grogginess.  She takes the Ambien as prescribed and denies side effects. She did come in for an office visit for her migraines in 5/2020 and elavil was added to her regimen. She has not noticed excessive sleepiness or daytime somnolence. Migraines are resolved.       PAST MEDICAL HISTORY:  Past Medical History:   Diagnosis Date    Depression        PAST SURGICAL HISTORY:  Past Surgical History:   Procedure Laterality Date    BACK SURGERY         SOCIAL HISTORY:  Social History     Socioeconomic History    Marital status:      Spouse name: Not on file    Number of children: Not on file    Years of education: Not on file    Highest education level: Not on file   Occupational History    Not on file   Social Needs    Financial resource strain: Not hard at all    Food insecurity     Worry: Never true     Inability: Never true    Transportation needs     Medical: No     Non-medical: No   Tobacco Use    Smoking status: Current Every Day Smoker     Packs/day: 0.25     Types: Cigarettes    Smokeless tobacco: Current User   Substance and Sexual Activity    Alcohol use: Yes     Frequency: 2-3 times a week     Drinks per session: 1 or 2     Binge frequency: Less than monthly     Comment: weekends (wine)    Drug use: No    Sexual activity: Yes     Partners: Female   Lifestyle    Physical activity     Days per week: 4 days     Minutes per session: 40 min    Stress: Rather much   Relationships    Social connections      Talks on phone: Twice a week     Gets together: Twice a week     Attends Latter day service: Not on file     Active member of club or organization: No     Attends meetings of clubs or organizations: Never     Relationship status:    Other Topics Concern    Not on file   Social History Narrative    Not on file       FAMILY HISTORY:  Family History   Problem Relation Age of Onset    Depression Mother     Asthma Mother     Hypertension Father     Diabetes type II Father        ALLERGIES AND MEDICATIONS: updated and reviewed.  Review of patient's allergies indicates:   Allergen Reactions    Sulfa (sulfonamide antibiotics) Rash     Current Outpatient Medications   Medication Sig Dispense Refill    amitriptyline (ELAVIL) 50 MG tablet Take 1 tablet (50 mg total) by mouth every evening. 90 tablet 1    butalbital-acetaminophen-caffeine -40 mg (FIORICET, ESGIC) -40 mg per tablet Take 1 tablet by mouth every 6 (six) hours as needed for Headaches. (Patient not taking: Reported on 5/7/2020) 16 tablet 0    citalopram (CELEXA) 20 MG tablet Take 1 tablet (20 mg total) by mouth once daily. 90 tablet 1    loratadine (CLARITIN) 10 mg tablet Take 10 mg by mouth once daily.  1    promethazine (PHENERGAN) 25 MG tablet Take 1 tablet (25 mg total) by mouth every 6 (six) hours as needed for Nausea. 12 tablet 0    rosuvastatin (CRESTOR) 40 MG Tab Take 1 tablet (40 mg total) by mouth every evening. 90 tablet 3    tiZANidine (ZANAFLEX) 4 MG tablet Take 1 tablet (4 mg total) by mouth every 8 (eight) hours. 20 tablet 0    zolpidem (AMBIEN) 5 MG Tab Take 1 tablet (5 mg total) by mouth nightly as needed. 30 tablet 2     No current facility-administered medications for this visit.          ROS  Review of Systems   Constitutional: Negative for activity change and unexpected weight change.   HENT: Negative for hearing loss, rhinorrhea and trouble swallowing.    Eyes: Negative for discharge and visual disturbance.    Respiratory: Negative for chest tightness and wheezing.    Cardiovascular: Negative for chest pain and palpitations.   Gastrointestinal: Negative for blood in stool, constipation, diarrhea and vomiting.   Endocrine: Negative for polydipsia and polyuria.   Genitourinary: Negative for difficulty urinating, dysuria, hematuria and menstrual problem.   Musculoskeletal: Negative for arthralgias, joint swelling and neck pain.   Neurological: Negative for weakness and headaches.   Psychiatric/Behavioral: Positive for dysphoric mood (dependent on stress level at work). Negative for confusion and sleep disturbance.           Physical Exam  There were no vitals filed for this visit. There is no height or weight on file to calculate BMI.          Physical Exam  Constitutional:       General: She is not in acute distress.     Appearance: Normal appearance. She is not ill-appearing.   HENT:      Head: Normocephalic and atraumatic.      Mouth/Throat:      Mouth: Mucous membranes are moist.      Pharynx: Oropharynx is clear.   Eyes:      Extraocular Movements: Extraocular movements intact.      Conjunctiva/sclera: Conjunctivae normal.   Neck:      Musculoskeletal: Normal range of motion.   Pulmonary:      Effort: Pulmonary effort is normal. No respiratory distress.   Neurological:      General: No focal deficit present.      Mental Status: She is alert.   Psychiatric:         Mood and Affect: Mood normal.         Behavior: Behavior normal.         Thought Content: Thought content normal.           Health Maintenance       Date Due Completion Date    Hepatitis C Screening 1980 ---    HIV Screening 03/22/1995 ---    TETANUS VACCINE 03/22/1998 ---    Mammogram 03/22/2020 ---    Pneumococcal Vaccine (Medium Risk) (1 of 1 - PPSV23) 10/18/2020 (Originally 3/22/1999) ---    Influenza Vaccine (1) 09/01/2020 ---    Pap Smear with HPV Cotest 02/15/2024 2/15/2019            Assessment and Plan:    Ms. France is a 40 year old female  with multiple medical conditions as indicated in the problem list who presents for follow-up.     Insomnia  -current regimen effective, refill ambien 5mg x 3 months    Depression  -mood labile lately due to increased stress at work and home. No acute issues, seeing counselor  -continue current regimen and follow up as needed     Vitamin D deficiency  -discussed recommendation from last appt to take 2000 unit OTC supplement  -recheck lab at next visit    Iron deficiency anemia  -discussed recommendation from last appt to take OTC supplement  -recheck lab at next visit     Bilateral knee pain  -worsening, suggested office visit for evaluation       1. Insomnia, unspecified type  - Stable; no acute issues  - The current medical regimen is effective;  continue present plan and medications.  - zolpidem (AMBIEN) 5 MG Tab; Take 1 tablet (5 mg total) by mouth nightly as needed.  Dispense: 30 tablet; Refill: 2      RTC in 3 months      I hereby acknowledge that I am relying upon documentation authored by a medical student working under my supervision and further I hereby attest that I have verified the student documentation or findings by personally re-performing the physical exam and medical decision making activities of the Evaluation and Management service to be billed.  María Elena Garcia

## 2020-08-10 DIAGNOSIS — M25.561 CHRONIC PAIN OF BOTH KNEES: Primary | ICD-10-CM

## 2020-08-10 DIAGNOSIS — M25.562 CHRONIC PAIN OF BOTH KNEES: Primary | ICD-10-CM

## 2020-08-10 DIAGNOSIS — G89.29 CHRONIC PAIN OF BOTH KNEES: Primary | ICD-10-CM

## 2020-08-10 RX ORDER — DICLOFENAC SODIUM 50 MG/1
50 TABLET, DELAYED RELEASE ORAL 2 TIMES DAILY PRN
Qty: 60 TABLET | Refills: 0 | Status: SHIPPED | OUTPATIENT
Start: 2020-08-10 | End: 2020-08-25 | Stop reason: SDUPTHER

## 2020-08-14 DIAGNOSIS — Z11.59 NEED FOR HEPATITIS C SCREENING TEST: ICD-10-CM

## 2020-08-25 ENCOUNTER — OFFICE VISIT (OUTPATIENT)
Dept: FAMILY MEDICINE | Facility: CLINIC | Age: 40
End: 2020-08-25
Payer: COMMERCIAL

## 2020-08-25 VITALS
HEART RATE: 113 BPM | DIASTOLIC BLOOD PRESSURE: 82 MMHG | BODY MASS INDEX: 37.41 KG/M2 | HEIGHT: 66 IN | TEMPERATURE: 99 F | OXYGEN SATURATION: 97 % | SYSTOLIC BLOOD PRESSURE: 128 MMHG | WEIGHT: 232.81 LBS

## 2020-08-25 DIAGNOSIS — Z12.31 ENCOUNTER FOR SCREENING MAMMOGRAM FOR BREAST CANCER: Primary | ICD-10-CM

## 2020-08-25 DIAGNOSIS — M25.562 CHRONIC PAIN OF BOTH KNEES: ICD-10-CM

## 2020-08-25 DIAGNOSIS — M25.561 CHRONIC PAIN OF BOTH KNEES: ICD-10-CM

## 2020-08-25 DIAGNOSIS — G89.29 CHRONIC PAIN OF BOTH KNEES: ICD-10-CM

## 2020-08-25 PROCEDURE — 3008F PR BODY MASS INDEX (BMI) DOCUMENTED: ICD-10-PCS | Mod: CPTII,S$GLB,, | Performed by: INTERNAL MEDICINE

## 2020-08-25 PROCEDURE — 99214 OFFICE O/P EST MOD 30 MIN: CPT | Mod: S$GLB,,, | Performed by: INTERNAL MEDICINE

## 2020-08-25 PROCEDURE — 99999 PR PBB SHADOW E&M-EST. PATIENT-LVL IV: ICD-10-PCS | Mod: PBBFAC,,, | Performed by: INTERNAL MEDICINE

## 2020-08-25 PROCEDURE — 3008F BODY MASS INDEX DOCD: CPT | Mod: CPTII,S$GLB,, | Performed by: INTERNAL MEDICINE

## 2020-08-25 PROCEDURE — 99999 PR PBB SHADOW E&M-EST. PATIENT-LVL IV: CPT | Mod: PBBFAC,,, | Performed by: INTERNAL MEDICINE

## 2020-08-25 PROCEDURE — 99214 PR OFFICE/OUTPT VISIT, EST, LEVL IV, 30-39 MIN: ICD-10-PCS | Mod: S$GLB,,, | Performed by: INTERNAL MEDICINE

## 2020-08-25 RX ORDER — DICLOFENAC SODIUM 50 MG/1
50 TABLET, DELAYED RELEASE ORAL 2 TIMES DAILY PRN
Qty: 180 TABLET | Refills: 2 | Status: SHIPPED | OUTPATIENT
Start: 2020-08-25 | End: 2021-05-31 | Stop reason: SDUPTHER

## 2020-08-25 NOTE — PROGRESS NOTES
SUBJECTIVE     Chief Complaint   Patient presents with    Follow-up       HPI  Claribel France is a 40 y.o. female with multiple medical diagnoses as listed in the medical history and problem list that presents for follow-up for chronic bilateral knee pain.  Patient reports she has been doing much better since her last visit with current medication.  She has been taking diclofenac 2 times a day with good relief of pain down to a 3/10.  Pain was previously greater than a 10/10.  She also denies any further swelling.  Patient denies any adverse side effects from the diclofenac and she currently takes it with breakfast and dinner.  She presents today for refills and is without any other complaints today.    PAST MEDICAL HISTORY:  Past Medical History:   Diagnosis Date    Depression        PAST SURGICAL HISTORY:  Past Surgical History:   Procedure Laterality Date    BACK SURGERY         SOCIAL HISTORY:  Social History     Socioeconomic History    Marital status:      Spouse name: Not on file    Number of children: Not on file    Years of education: Not on file    Highest education level: Not on file   Occupational History    Not on file   Social Needs    Financial resource strain: Not hard at all    Food insecurity     Worry: Never true     Inability: Never true    Transportation needs     Medical: No     Non-medical: No   Tobacco Use    Smoking status: Current Every Day Smoker     Packs/day: 0.25     Types: Cigarettes    Smokeless tobacco: Current User   Substance and Sexual Activity    Alcohol use: Yes     Frequency: 2-3 times a week     Drinks per session: 1 or 2     Binge frequency: Less than monthly     Comment: weekends (wine)    Drug use: No    Sexual activity: Yes     Partners: Female   Lifestyle    Physical activity     Days per week: 4 days     Minutes per session: 40 min    Stress: Rather much   Relationships    Social connections     Talks on phone: Twice a week     Gets together:  Twice a week     Attends Oriental orthodox service: Not on file     Active member of club or organization: No     Attends meetings of clubs or organizations: Never     Relationship status:    Other Topics Concern    Not on file   Social History Narrative    Not on file       FAMILY HISTORY:  Family History   Problem Relation Age of Onset    Depression Mother     Asthma Mother     Hypertension Father     Diabetes type II Father        ALLERGIES AND MEDICATIONS: updated and reviewed.  Review of patient's allergies indicates:   Allergen Reactions    Sulfa (sulfonamide antibiotics) Rash     Current Outpatient Medications   Medication Sig Dispense Refill    amitriptyline (ELAVIL) 50 MG tablet Take 1 tablet (50 mg total) by mouth every evening. 90 tablet 1    citalopram (CELEXA) 20 MG tablet Take 1 tablet by mouth once daily 90 tablet 1    diclofenac (VOLTAREN) 50 MG EC tablet Take 1 tablet (50 mg total) by mouth 2 (two) times daily as needed (TAKE WITH MEALS). 180 tablet 2    rosuvastatin (CRESTOR) 40 MG Tab Take 1 tablet (40 mg total) by mouth every evening. 90 tablet 3    zolpidem (AMBIEN) 5 MG Tab Take 1 tablet (5 mg total) by mouth nightly as needed. 30 tablet 2     No current facility-administered medications for this visit.        ROS  Review of Systems   Constitutional: Negative for activity change and unexpected weight change.   HENT: Negative for hearing loss, rhinorrhea and trouble swallowing.    Eyes: Negative for discharge and visual disturbance.   Respiratory: Negative for chest tightness and wheezing.    Cardiovascular: Negative for chest pain and palpitations.   Gastrointestinal: Negative for blood in stool, constipation, diarrhea and vomiting.   Endocrine: Negative for polydipsia and polyuria.   Genitourinary: Negative for difficulty urinating, dysuria, hematuria and menstrual problem.   Musculoskeletal: Positive for arthralgias (B/L knees) and joint swelling. Negative for neck pain.   Skin:  "Negative for rash and wound.   Neurological: Negative for weakness and headaches.   Psychiatric/Behavioral: Negative for confusion and dysphoric mood.         OBJECTIVE     Physical Exam  Vitals:    08/25/20 1019   BP: 128/82   Pulse: (!) 113   Temp: 99.1 °F (37.3 °C)    Body mass index is 37.58 kg/m².  Weight: 105.6 kg (232 lb 12.9 oz)   Height: 5' 6" (167.6 cm)     Physical Exam  Constitutional:       General: She is not in acute distress.     Appearance: She is well-developed.   HENT:      Head: Normocephalic and atraumatic.      Right Ear: External ear normal.      Left Ear: External ear normal.      Nose: Nose normal.   Eyes:      General: No scleral icterus.        Right eye: No discharge.         Left eye: No discharge.      Conjunctiva/sclera: Conjunctivae normal.   Neck:      Musculoskeletal: Normal range of motion and neck supple.      Vascular: No JVD.      Trachea: No tracheal deviation.   Cardiovascular:      Rate and Rhythm: Normal rate and regular rhythm.      Heart sounds: No murmur. No friction rub. No gallop.    Pulmonary:      Effort: Pulmonary effort is normal. No respiratory distress.      Breath sounds: Normal breath sounds. No wheezing.   Abdominal:      General: Bowel sounds are normal. There is no distension.      Palpations: Abdomen is soft. There is no mass.      Tenderness: There is no abdominal tenderness. There is no guarding or rebound.   Musculoskeletal: Normal range of motion.         General: No tenderness or deformity.      Comments: Bilateral knee crepitus(L>R)   Skin:     General: Skin is warm and dry.      Findings: No erythema or rash.   Neurological:      Mental Status: She is alert and oriented to person, place, and time.      Motor: No abnormal muscle tone.      Coordination: Coordination normal.   Psychiatric:         Behavior: Behavior normal.         Thought Content: Thought content normal.         Judgment: Judgment normal.           Health Maintenance       Date Due " Completion Date    Hepatitis C Screening 1980 ---    HIV Screening 03/22/1995 ---    TETANUS VACCINE 03/22/1998 ---    Mammogram 03/22/2020 ---    Pneumococcal Vaccine (Medium Risk) (1 of 1 - PPSV23) 10/18/2020 (Originally 3/22/1999) ---    Influenza Vaccine (1) 09/01/2020 ---    Pap Smear with HPV Cotest 02/15/2024 2/15/2019            ASSESSMENT     40 y.o. female with     1. Encounter for screening mammogram for breast cancer    2. Chronic pain of both knees        PLAN:     1. Chronic pain of both knees  - Controlled  - The current medical regimen is effective;  continue present plan and medications.  - diclofenac (VOLTAREN) 50 MG EC tablet; Take 1 tablet (50 mg total) by mouth 2 (two) times daily as needed (TAKE WITH MEALS).  Dispense: 180 tablet; Refill: 2    2. Encounter for screening mammogram for breast cancer  - Discussed importance of scheduling and the patient became tearful given the passing of her mother; she refused scheduling a mammogram today as she is still working up the nerve to have testing done; discussed importance of early detection and the need to prevent disease; all questions/concerns addressed and patient voiced understanding        RTC in 3 months     María Elena Garcia MD  08/25/2020 10:35 AM        No follow-ups on file.

## 2020-10-15 DIAGNOSIS — G47.00 INSOMNIA, UNSPECIFIED TYPE: ICD-10-CM

## 2020-10-15 RX ORDER — ZOLPIDEM TARTRATE 5 MG/1
5 TABLET ORAL NIGHTLY PRN
Qty: 30 TABLET | Refills: 2 | OUTPATIENT
Start: 2020-10-15 | End: 2021-04-15

## 2020-10-15 NOTE — TELEPHONE ENCOUNTER
Last Office Visit Info:   The patient's last visit with María Elena Garcia MD was on 8/25/2020.    The patient's last visit in current department was on 8/25/2020.        Last CBC Results:   Lab Results   Component Value Date    WBC 3.85 (L) 01/10/2020    HGB 10.3 (L) 01/10/2020    HCT 33.5 (L) 01/10/2020     01/10/2020       Last CMP Results  Lab Results   Component Value Date     01/10/2020    K 4.3 01/10/2020     01/10/2020    CO2 25 01/10/2020    BUN 10 01/10/2020    CREATININE 0.7 01/10/2020    CALCIUM 9.5 01/10/2020    ALBUMIN 3.9 01/10/2020    AST 22 01/10/2020    ALT 21 01/10/2020       Last Lipids  Lab Results   Component Value Date    CHOL 342 (H) 01/10/2020    TRIG 162 (H) 01/10/2020    HDL 77 (H) 01/10/2020    LDLCALC 232.6 (H) 01/10/2020       Last A1C  Lab Results   Component Value Date    HGBA1C 5.7 (H) 01/10/2020       Last TSH  Lab Results   Component Value Date    TSH 0.408 01/10/2020         Current Med Refills  Medication List with Changes/Refills   Current Medications    AMITRIPTYLINE (ELAVIL) 50 MG TABLET    Take 1 tablet (50 mg total) by mouth every evening.       Start Date: 5/7/2020  End Date: 5/7/2021    CITALOPRAM (CELEXA) 20 MG TABLET    Take 1 tablet by mouth once daily       Start Date: 8/17/2020 End Date: --    DICLOFENAC (VOLTAREN) 50 MG EC TABLET    Take 1 tablet (50 mg total) by mouth 2 (two) times daily as needed (TAKE WITH MEALS).       Start Date: 8/25/2020 End Date: --    ROSUVASTATIN (CRESTOR) 40 MG TAB    Take 1 tablet (40 mg total) by mouth every evening.       Start Date: 1/13/2020 End Date: 1/12/2021    ZOLPIDEM (AMBIEN) 5 MG TAB    Take 1 tablet (5 mg total) by mouth nightly as needed.       Start Date: 8/7/2020  End Date: 2/5/2021

## 2020-10-16 NOTE — TELEPHONE ENCOUNTER
Calling pt to inform her med refill for ambien denied because she should not need a refill until November.  Lm for pt to call our office.

## 2020-11-03 ENCOUNTER — OFFICE VISIT (OUTPATIENT)
Dept: FAMILY MEDICINE | Facility: CLINIC | Age: 40
End: 2020-11-03
Payer: COMMERCIAL

## 2020-11-03 VITALS
RESPIRATION RATE: 16 BRPM | DIASTOLIC BLOOD PRESSURE: 80 MMHG | SYSTOLIC BLOOD PRESSURE: 130 MMHG | HEIGHT: 66 IN | HEART RATE: 110 BPM | TEMPERATURE: 98 F | BODY MASS INDEX: 38.97 KG/M2 | OXYGEN SATURATION: 97 % | WEIGHT: 242.5 LBS

## 2020-11-03 DIAGNOSIS — M25.561 CHRONIC PAIN OF BOTH KNEES: ICD-10-CM

## 2020-11-03 DIAGNOSIS — G89.29 CHRONIC PAIN OF BOTH KNEES: ICD-10-CM

## 2020-11-03 DIAGNOSIS — G47.00 INSOMNIA, UNSPECIFIED TYPE: Primary | ICD-10-CM

## 2020-11-03 DIAGNOSIS — F33.0 MILD EPISODE OF RECURRENT MAJOR DEPRESSIVE DISORDER: ICD-10-CM

## 2020-11-03 DIAGNOSIS — G43.919 INTRACTABLE MIGRAINE WITHOUT STATUS MIGRAINOSUS, UNSPECIFIED MIGRAINE TYPE: ICD-10-CM

## 2020-11-03 DIAGNOSIS — M25.562 CHRONIC PAIN OF BOTH KNEES: ICD-10-CM

## 2020-11-03 PROCEDURE — 3008F PR BODY MASS INDEX (BMI) DOCUMENTED: ICD-10-PCS | Mod: CPTII,S$GLB,, | Performed by: INTERNAL MEDICINE

## 2020-11-03 PROCEDURE — 99214 PR OFFICE/OUTPT VISIT, EST, LEVL IV, 30-39 MIN: ICD-10-PCS | Mod: S$GLB,,, | Performed by: INTERNAL MEDICINE

## 2020-11-03 PROCEDURE — 99999 PR PBB SHADOW E&M-EST. PATIENT-LVL IV: CPT | Mod: PBBFAC,,, | Performed by: INTERNAL MEDICINE

## 2020-11-03 PROCEDURE — 99214 OFFICE O/P EST MOD 30 MIN: CPT | Mod: S$GLB,,, | Performed by: INTERNAL MEDICINE

## 2020-11-03 PROCEDURE — 3008F BODY MASS INDEX DOCD: CPT | Mod: CPTII,S$GLB,, | Performed by: INTERNAL MEDICINE

## 2020-11-03 PROCEDURE — 99999 PR PBB SHADOW E&M-EST. PATIENT-LVL IV: ICD-10-PCS | Mod: PBBFAC,,, | Performed by: INTERNAL MEDICINE

## 2020-11-03 RX ORDER — ZOLPIDEM TARTRATE 5 MG/1
5 TABLET ORAL NIGHTLY PRN
Qty: 30 TABLET | Refills: 5 | Status: SHIPPED | OUTPATIENT
Start: 2020-11-07 | End: 2021-05-31 | Stop reason: SDUPTHER

## 2020-11-03 RX ORDER — AMITRIPTYLINE HYDROCHLORIDE 50 MG/1
50 TABLET, FILM COATED ORAL NIGHTLY
Qty: 90 TABLET | Refills: 1 | Status: SHIPPED | OUTPATIENT
Start: 2020-11-03 | End: 2021-05-31 | Stop reason: SDUPTHER

## 2020-11-03 NOTE — PROGRESS NOTES
SUBJECTIVE     Chief Complaint   Patient presents with    Insomnia       HPI  Claribel France is a 40 y.o. female with multiple medical diagnoses as listed in the medical history and problem list that presents for follow-up for insomnia. Pt has been doing well since her last visit.  She is fully compliant with meds and denies any adverse side effects.  She takes the Ambien as prescribed and denies any AM grogginess 2/2 med. She also continues her Elavil nightly for migraines and denies excessive sleepiness or daytime somnolence. Otherwise, pt reports her knees have worsened, so she's ready to see Ortho for further eval.     PAST MEDICAL HISTORY:  Past Medical History:   Diagnosis Date    Depression        PAST SURGICAL HISTORY:  Past Surgical History:   Procedure Laterality Date    BACK SURGERY         SOCIAL HISTORY:  Social History     Socioeconomic History    Marital status:      Spouse name: Not on file    Number of children: Not on file    Years of education: Not on file    Highest education level: Not on file   Occupational History    Not on file   Social Needs    Financial resource strain: Not hard at all    Food insecurity     Worry: Never true     Inability: Never true    Transportation needs     Medical: No     Non-medical: No   Tobacco Use    Smoking status: Current Every Day Smoker     Packs/day: 0.25     Types: Cigarettes    Smokeless tobacco: Current User   Substance and Sexual Activity    Alcohol use: Yes     Frequency: 2-3 times a week     Drinks per session: 1 or 2     Binge frequency: Less than monthly     Comment: weekends (wine)    Drug use: No    Sexual activity: Yes     Partners: Female   Lifestyle    Physical activity     Days per week: 4 days     Minutes per session: 40 min    Stress: Rather much   Relationships    Social connections     Talks on phone: Twice a week     Gets together: Twice a week     Attends Yarsani service: Not on file     Active member of club  or organization: No     Attends meetings of clubs or organizations: Never     Relationship status:    Other Topics Concern    Not on file   Social History Narrative    Not on file       FAMILY HISTORY:  Family History   Problem Relation Age of Onset    Depression Mother     Asthma Mother     Hypertension Father     Diabetes type II Father        ALLERGIES AND MEDICATIONS: updated and reviewed.  Review of patient's allergies indicates:   Allergen Reactions    Sulfa (sulfonamide antibiotics) Rash     Current Outpatient Medications   Medication Sig Dispense Refill    amitriptyline (ELAVIL) 50 MG tablet Take 1 tablet (50 mg total) by mouth every evening. 90 tablet 1    citalopram (CELEXA) 20 MG tablet Take 1 tablet by mouth once daily 90 tablet 1    diclofenac (VOLTAREN) 50 MG EC tablet Take 1 tablet (50 mg total) by mouth 2 (two) times daily as needed (TAKE WITH MEALS). 180 tablet 2    rosuvastatin (CRESTOR) 40 MG Tab Take 1 tablet (40 mg total) by mouth every evening. 90 tablet 3    [START ON 11/7/2020] zolpidem (AMBIEN) 5 MG Tab Take 1 tablet (5 mg total) by mouth nightly as needed. 30 tablet 5     No current facility-administered medications for this visit.        ROS  Review of Systems   Constitutional: Negative for chills and fever.   HENT: Negative for hearing loss and sore throat.    Eyes: Negative for visual disturbance.   Respiratory: Negative for cough and shortness of breath.    Cardiovascular: Negative for chest pain, palpitations and leg swelling.   Gastrointestinal: Negative for abdominal pain, constipation, diarrhea, nausea and vomiting.   Genitourinary: Negative for dysuria, frequency and urgency.   Musculoskeletal: Positive for arthralgias (B/L knees). Negative for joint swelling and myalgias.   Skin: Negative for rash and wound.   Neurological: Negative for headaches.   Psychiatric/Behavioral: Negative for agitation and confusion. The patient is not nervous/anxious.   "        OBJECTIVE     Physical Exam  Vitals:    11/03/20 1054   BP: 130/80   Pulse: 110   Resp: 16   Temp: 98.3 °F (36.8 °C)    Body mass index is 39.14 kg/m².  Weight: 110 kg (242 lb 8.1 oz)   Height: 5' 6" (167.6 cm)     Physical Exam  Constitutional:       General: She is not in acute distress.     Appearance: She is well-developed.   HENT:      Head: Normocephalic and atraumatic.      Right Ear: External ear normal.      Left Ear: External ear normal.      Nose: Nose normal.   Eyes:      General: No scleral icterus.        Right eye: No discharge.         Left eye: No discharge.      Conjunctiva/sclera: Conjunctivae normal.   Neck:      Musculoskeletal: Normal range of motion and neck supple.      Vascular: No JVD.      Trachea: No tracheal deviation.   Pulmonary:      Effort: Pulmonary effort is normal. No respiratory distress.   Musculoskeletal: Normal range of motion.         General: No deformity.   Skin:     General: Skin is dry.      Findings: No erythema or rash.   Neurological:      Mental Status: She is alert and oriented to person, place, and time.      Motor: No abnormal muscle tone.      Coordination: Coordination normal.   Psychiatric:         Behavior: Behavior normal.         Thought Content: Thought content normal.         Judgment: Judgment normal.           Health Maintenance       Date Due Completion Date    Hepatitis C Screening 1980 ---    HIV Screening 03/22/1995 ---    TETANUS VACCINE 03/22/1998 ---    Pneumococcal Vaccine (Medium Risk) (1 of 1 - PPSV23) 03/22/1999 ---    Mammogram 03/22/2020 ---    Influenza Vaccine (1) 08/01/2020 ---    Pap Smear with HPV Cotest 02/15/2024 2/15/2019            ASSESSMENT     40 y.o. female with     1. Insomnia, unspecified type    2. Chronic pain of both knees    3. Intractable migraine without status migrainosus, unspecified migraine type    4. Mild episode of recurrent major depressive disorder        PLAN:     1. Insomnia, unspecified type  - " Stable; no acute issues  - The current medical regimen is effective;  continue present plan and medications.  - zolpidem (AMBIEN) 5 MG Tab; Take 1 tablet (5 mg total) by mouth nightly as needed.  Dispense: 30 tablet; Refill: 5    2. Chronic pain of both knees  - Ambulatory referral/consult to Orthopedics; Future    3. Intractable migraine without status migrainosus, unspecified migraine type  - Stable; no acute issues  - The current medical regimen is effective;  continue present plan and medications.  - amitriptyline (ELAVIL) 50 MG tablet; Take 1 tablet (50 mg total) by mouth every evening.  Dispense: 90 tablet; Refill: 1    4. Mild episode of recurrent major depressive disorder  - Stable; no acute issues  - The current medical regimen is effective;  continue present plan and medications.  - amitriptyline (ELAVIL) 50 MG tablet; Take 1 tablet (50 mg total) by mouth every evening.  Dispense: 90 tablet; Refill: 1        RTC in 6 months     María Elena Garcia MD  11/03/2020 11:07 AM        No follow-ups on file.

## 2020-11-12 ENCOUNTER — TELEPHONE (OUTPATIENT)
Dept: FAMILY MEDICINE | Facility: CLINIC | Age: 40
End: 2020-11-12

## 2020-11-12 NOTE — LETTER
November 12, 2020    Claribel France  [address] [place] [address] [address] [place] [id number]             [facility] [address]  [place] [address]  Phone: [phone]  Fax: [phone] Dear Ms. France:    Karina we were unable to contact you to schedule your Orthopedic appointment. Please give the referral department a call at [phone].        If you have any questions or concerns, please don't hesitate to call.    Sincerely,        Radha Amezquita MA

## 2020-11-20 ENCOUNTER — HOSPITAL ENCOUNTER (OUTPATIENT)
Dept: RADIOLOGY | Facility: HOSPITAL | Age: 40
Discharge: HOME | End: 2020-11-20
Attending: INTERNAL MEDICINE
Payer: COMMERCIAL

## 2020-11-20 DIAGNOSIS — Z12.31 ENCOUNTER FOR SCREENING MAMMOGRAM FOR BREAST CANCER: ICD-10-CM

## 2020-11-20 PROCEDURE — 77063 BREAST TOMOSYNTHESIS BI: CPT | Mod: 26,,, | Performed by: RADIOLOGY

## 2020-11-20 PROCEDURE — 77063 MAMMO DIGITAL SCREENING BILAT WITH TOMO: ICD-10-PCS | Mod: 26,,, | Performed by: RADIOLOGY

## 2020-11-20 PROCEDURE — 77067 SCR MAMMO BI INCL CAD: CPT | Mod: TC

## 2020-11-20 PROCEDURE — 77067 SCR MAMMO BI INCL CAD: CPT | Mod: 26,,, | Performed by: RADIOLOGY

## 2020-11-20 PROCEDURE — 77067 MAMMO DIGITAL SCREENING BILAT WITH TOMO: ICD-10-PCS | Mod: 26,,, | Performed by: RADIOLOGY

## 2021-04-16 ENCOUNTER — PATIENT MESSAGE (OUTPATIENT)
Dept: RESEARCH | Facility: HOSPITAL | Age: 41
End: 2021-04-16

## 2021-05-31 ENCOUNTER — OFFICE VISIT (OUTPATIENT)
Dept: FAMILY MEDICINE | Facility: CLINIC | Age: 41
End: 2021-05-31
Payer: COMMERCIAL

## 2021-05-31 ENCOUNTER — LAB VISIT (OUTPATIENT)
Dept: LAB | Facility: HOSPITAL | Age: 41
End: 2021-05-31
Attending: INTERNAL MEDICINE
Payer: COMMERCIAL

## 2021-05-31 VITALS
SYSTOLIC BLOOD PRESSURE: 122 MMHG | HEIGHT: 66 IN | TEMPERATURE: 99 F | BODY MASS INDEX: 39.97 KG/M2 | HEART RATE: 94 BPM | DIASTOLIC BLOOD PRESSURE: 86 MMHG | OXYGEN SATURATION: 98 % | WEIGHT: 248.69 LBS

## 2021-05-31 DIAGNOSIS — Z11.4 ENCOUNTER FOR SCREENING FOR HIV: ICD-10-CM

## 2021-05-31 DIAGNOSIS — F33.0 MILD EPISODE OF RECURRENT MAJOR DEPRESSIVE DISORDER: ICD-10-CM

## 2021-05-31 DIAGNOSIS — Z00.00 ANNUAL PHYSICAL EXAM: Primary | ICD-10-CM

## 2021-05-31 DIAGNOSIS — E78.2 MIXED HYPERLIPIDEMIA: ICD-10-CM

## 2021-05-31 DIAGNOSIS — F17.200 SMOKER: ICD-10-CM

## 2021-05-31 DIAGNOSIS — M25.562 CHRONIC PAIN OF BOTH KNEES: ICD-10-CM

## 2021-05-31 DIAGNOSIS — Z11.59 ENCOUNTER FOR HEPATITIS C SCREENING TEST FOR LOW RISK PATIENT: ICD-10-CM

## 2021-05-31 DIAGNOSIS — G47.00 INSOMNIA, UNSPECIFIED TYPE: ICD-10-CM

## 2021-05-31 DIAGNOSIS — F41.9 ANXIETY: ICD-10-CM

## 2021-05-31 DIAGNOSIS — G89.29 CHRONIC PAIN OF BOTH KNEES: ICD-10-CM

## 2021-05-31 DIAGNOSIS — G43.919 INTRACTABLE MIGRAINE WITHOUT STATUS MIGRAINOSUS, UNSPECIFIED MIGRAINE TYPE: ICD-10-CM

## 2021-05-31 DIAGNOSIS — D75.839 THROMBOCYTOSIS: ICD-10-CM

## 2021-05-31 DIAGNOSIS — M25.561 CHRONIC PAIN OF BOTH KNEES: ICD-10-CM

## 2021-05-31 DIAGNOSIS — M46.1 SI (SACROILIAC) JOINT INFLAMMATION: ICD-10-CM

## 2021-05-31 PROCEDURE — 1126F AMNT PAIN NOTED NONE PRSNT: CPT | Mod: S$GLB,,, | Performed by: INTERNAL MEDICINE

## 2021-05-31 PROCEDURE — 99999 PR PBB SHADOW E&M-EST. PATIENT-LVL III: ICD-10-PCS | Mod: PBBFAC,,, | Performed by: INTERNAL MEDICINE

## 2021-05-31 PROCEDURE — 3008F BODY MASS INDEX DOCD: CPT | Mod: CPTII,S$GLB,, | Performed by: INTERNAL MEDICINE

## 2021-05-31 PROCEDURE — 99396 PREV VISIT EST AGE 40-64: CPT | Mod: S$GLB,,, | Performed by: INTERNAL MEDICINE

## 2021-05-31 PROCEDURE — 86703 HIV-1/HIV-2 1 RESULT ANTBDY: CPT | Performed by: INTERNAL MEDICINE

## 2021-05-31 PROCEDURE — 1126F PR PAIN SEVERITY QUANTIFIED, NO PAIN PRESENT: ICD-10-PCS | Mod: S$GLB,,, | Performed by: INTERNAL MEDICINE

## 2021-05-31 PROCEDURE — 86803 HEPATITIS C AB TEST: CPT | Performed by: INTERNAL MEDICINE

## 2021-05-31 PROCEDURE — 99999 PR PBB SHADOW E&M-EST. PATIENT-LVL III: CPT | Mod: PBBFAC,,, | Performed by: INTERNAL MEDICINE

## 2021-05-31 PROCEDURE — 99396 PR PREVENTIVE VISIT,EST,40-64: ICD-10-PCS | Mod: S$GLB,,, | Performed by: INTERNAL MEDICINE

## 2021-05-31 PROCEDURE — 3008F PR BODY MASS INDEX (BMI) DOCUMENTED: ICD-10-PCS | Mod: CPTII,S$GLB,, | Performed by: INTERNAL MEDICINE

## 2021-05-31 PROCEDURE — 36415 COLL VENOUS BLD VENIPUNCTURE: CPT | Mod: PO | Performed by: INTERNAL MEDICINE

## 2021-05-31 RX ORDER — ROSUVASTATIN CALCIUM 40 MG/1
40 TABLET, COATED ORAL NIGHTLY
Qty: 90 TABLET | Refills: 3 | Status: SHIPPED | OUTPATIENT
Start: 2021-05-31 | End: 2023-08-07

## 2021-05-31 RX ORDER — AMITRIPTYLINE HYDROCHLORIDE 50 MG/1
50 TABLET, FILM COATED ORAL NIGHTLY
Qty: 90 TABLET | Refills: 1 | Status: SHIPPED | OUTPATIENT
Start: 2021-05-31 | End: 2021-11-22 | Stop reason: SDUPTHER

## 2021-05-31 RX ORDER — CITALOPRAM 40 MG/1
40 TABLET, FILM COATED ORAL DAILY
Qty: 90 TABLET | Refills: 3 | Status: SHIPPED | OUTPATIENT
Start: 2021-05-31 | End: 2022-07-04 | Stop reason: SDUPTHER

## 2021-05-31 RX ORDER — ZOLPIDEM TARTRATE 5 MG/1
5 TABLET ORAL NIGHTLY PRN
Qty: 30 TABLET | Refills: 5 | Status: SHIPPED | OUTPATIENT
Start: 2021-05-31 | End: 2021-11-22 | Stop reason: SDUPTHER

## 2021-05-31 RX ORDER — DICLOFENAC SODIUM 50 MG/1
50 TABLET, DELAYED RELEASE ORAL 2 TIMES DAILY PRN
Qty: 180 TABLET | Refills: 2 | Status: SHIPPED | OUTPATIENT
Start: 2021-05-31 | End: 2023-08-07

## 2021-05-31 RX ORDER — CITALOPRAM 20 MG/1
20 TABLET, FILM COATED ORAL DAILY
Qty: 90 TABLET | Refills: 1 | Status: CANCELLED | OUTPATIENT
Start: 2021-05-31

## 2021-06-01 ENCOUNTER — TELEPHONE (OUTPATIENT)
Dept: FAMILY MEDICINE | Facility: CLINIC | Age: 41
End: 2021-06-01

## 2021-06-01 LAB
HCV AB SERPL QL IA: NEGATIVE
HIV 1+2 AB+HIV1 P24 AG SERPL QL IA: NEGATIVE

## 2021-06-03 ENCOUNTER — TELEPHONE (OUTPATIENT)
Dept: FAMILY MEDICINE | Facility: CLINIC | Age: 41
End: 2021-06-03

## 2021-06-04 ENCOUNTER — TELEPHONE (OUTPATIENT)
Dept: FAMILY MEDICINE | Facility: CLINIC | Age: 41
End: 2021-06-04

## 2021-06-17 ENCOUNTER — PATIENT MESSAGE (OUTPATIENT)
Dept: FAMILY MEDICINE | Facility: CLINIC | Age: 41
End: 2021-06-17

## 2021-06-17 DIAGNOSIS — G43.919 INTRACTABLE MIGRAINE WITHOUT STATUS MIGRAINOSUS, UNSPECIFIED MIGRAINE TYPE: Primary | ICD-10-CM

## 2021-06-17 RX ORDER — SUMATRIPTAN 50 MG/1
50 TABLET, FILM COATED ORAL
Qty: 9 TABLET | Refills: 2 | Status: SHIPPED | OUTPATIENT
Start: 2021-06-17 | End: 2022-02-28

## 2021-06-18 ENCOUNTER — PATIENT MESSAGE (OUTPATIENT)
Dept: FAMILY MEDICINE | Facility: CLINIC | Age: 41
End: 2021-06-18

## 2021-07-30 ENCOUNTER — OFFICE VISIT (OUTPATIENT)
Dept: FAMILY MEDICINE | Facility: CLINIC | Age: 41
End: 2021-07-30
Payer: COMMERCIAL

## 2021-07-30 VITALS
WEIGHT: 252.63 LBS | DIASTOLIC BLOOD PRESSURE: 90 MMHG | SYSTOLIC BLOOD PRESSURE: 130 MMHG | HEIGHT: 66 IN | TEMPERATURE: 99 F | HEART RATE: 105 BPM | OXYGEN SATURATION: 99 % | BODY MASS INDEX: 40.6 KG/M2

## 2021-07-30 DIAGNOSIS — E78.2 MIXED HYPERLIPIDEMIA: Primary | ICD-10-CM

## 2021-07-30 DIAGNOSIS — R73.03 PREDIABETES: ICD-10-CM

## 2021-07-30 PROCEDURE — 3075F SYST BP GE 130 - 139MM HG: CPT | Mod: CPTII,S$GLB,, | Performed by: FAMILY MEDICINE

## 2021-07-30 PROCEDURE — 1126F PR PAIN SEVERITY QUANTIFIED, NO PAIN PRESENT: ICD-10-PCS | Mod: CPTII,S$GLB,, | Performed by: FAMILY MEDICINE

## 2021-07-30 PROCEDURE — 3008F PR BODY MASS INDEX (BMI) DOCUMENTED: ICD-10-PCS | Mod: CPTII,S$GLB,, | Performed by: FAMILY MEDICINE

## 2021-07-30 PROCEDURE — 99215 OFFICE O/P EST HI 40 MIN: CPT | Mod: S$GLB,,, | Performed by: FAMILY MEDICINE

## 2021-07-30 PROCEDURE — 3080F DIAST BP >= 90 MM HG: CPT | Mod: CPTII,S$GLB,, | Performed by: FAMILY MEDICINE

## 2021-07-30 PROCEDURE — 99999 PR PBB SHADOW E&M-EST. PATIENT-LVL IV: ICD-10-PCS | Mod: PBBFAC,,, | Performed by: FAMILY MEDICINE

## 2021-07-30 PROCEDURE — 99999 PR PBB SHADOW E&M-EST. PATIENT-LVL IV: CPT | Mod: PBBFAC,,, | Performed by: FAMILY MEDICINE

## 2021-07-30 PROCEDURE — 3075F PR MOST RECENT SYSTOLIC BLOOD PRESS GE 130-139MM HG: ICD-10-PCS | Mod: CPTII,S$GLB,, | Performed by: FAMILY MEDICINE

## 2021-07-30 PROCEDURE — 99215 PR OFFICE/OUTPT VISIT, EST, LEVL V, 40-54 MIN: ICD-10-PCS | Mod: S$GLB,,, | Performed by: FAMILY MEDICINE

## 2021-07-30 PROCEDURE — 3008F BODY MASS INDEX DOCD: CPT | Mod: CPTII,S$GLB,, | Performed by: FAMILY MEDICINE

## 2021-07-30 PROCEDURE — 3080F PR MOST RECENT DIASTOLIC BLOOD PRESSURE >= 90 MM HG: ICD-10-PCS | Mod: CPTII,S$GLB,, | Performed by: FAMILY MEDICINE

## 2021-07-30 PROCEDURE — 1126F AMNT PAIN NOTED NONE PRSNT: CPT | Mod: CPTII,S$GLB,, | Performed by: FAMILY MEDICINE

## 2021-07-30 RX ORDER — TOPIRAMATE 25 MG/1
25 TABLET ORAL 2 TIMES DAILY
Qty: 30 TABLET | Refills: 0 | Status: SHIPPED | OUTPATIENT
Start: 2021-07-30 | End: 2021-08-24

## 2021-07-30 RX ORDER — PHENTERMINE HYDROCHLORIDE 15 MG/1
15 CAPSULE ORAL EVERY MORNING
Qty: 30 CAPSULE | Refills: 0 | Status: SHIPPED | OUTPATIENT
Start: 2021-07-30 | End: 2021-08-24

## 2021-08-24 ENCOUNTER — OFFICE VISIT (OUTPATIENT)
Dept: FAMILY MEDICINE | Facility: CLINIC | Age: 41
End: 2021-08-24
Payer: COMMERCIAL

## 2021-08-24 VITALS
HEART RATE: 117 BPM | OXYGEN SATURATION: 99 % | DIASTOLIC BLOOD PRESSURE: 82 MMHG | HEIGHT: 66 IN | BODY MASS INDEX: 41.1 KG/M2 | TEMPERATURE: 99 F | WEIGHT: 255.75 LBS | SYSTOLIC BLOOD PRESSURE: 120 MMHG

## 2021-08-24 DIAGNOSIS — R73.03 PREDIABETES: Primary | ICD-10-CM

## 2021-08-24 PROCEDURE — 99999 PR PBB SHADOW E&M-EST. PATIENT-LVL III: CPT | Mod: PBBFAC,,, | Performed by: FAMILY MEDICINE

## 2021-08-24 PROCEDURE — 99999 PR PBB SHADOW E&M-EST. PATIENT-LVL III: ICD-10-PCS | Mod: PBBFAC,,, | Performed by: FAMILY MEDICINE

## 2021-08-24 PROCEDURE — 3074F SYST BP LT 130 MM HG: CPT | Mod: CPTII,S$GLB,, | Performed by: FAMILY MEDICINE

## 2021-08-24 PROCEDURE — 1160F PR REVIEW ALL MEDS BY PRESCRIBER/CLIN PHARMACIST DOCUMENTED: ICD-10-PCS | Mod: CPTII,S$GLB,, | Performed by: FAMILY MEDICINE

## 2021-08-24 PROCEDURE — 3008F PR BODY MASS INDEX (BMI) DOCUMENTED: ICD-10-PCS | Mod: CPTII,S$GLB,, | Performed by: FAMILY MEDICINE

## 2021-08-24 PROCEDURE — 99214 PR OFFICE/OUTPT VISIT, EST, LEVL IV, 30-39 MIN: ICD-10-PCS | Mod: S$GLB,,, | Performed by: FAMILY MEDICINE

## 2021-08-24 PROCEDURE — 3079F PR MOST RECENT DIASTOLIC BLOOD PRESSURE 80-89 MM HG: ICD-10-PCS | Mod: CPTII,S$GLB,, | Performed by: FAMILY MEDICINE

## 2021-08-24 PROCEDURE — 1159F PR MEDICATION LIST DOCUMENTED IN MEDICAL RECORD: ICD-10-PCS | Mod: CPTII,S$GLB,, | Performed by: FAMILY MEDICINE

## 2021-08-24 PROCEDURE — 3008F BODY MASS INDEX DOCD: CPT | Mod: CPTII,S$GLB,, | Performed by: FAMILY MEDICINE

## 2021-08-24 PROCEDURE — 99214 OFFICE O/P EST MOD 30 MIN: CPT | Mod: S$GLB,,, | Performed by: FAMILY MEDICINE

## 2021-08-24 PROCEDURE — 1160F RVW MEDS BY RX/DR IN RCRD: CPT | Mod: CPTII,S$GLB,, | Performed by: FAMILY MEDICINE

## 2021-08-24 PROCEDURE — 3079F DIAST BP 80-89 MM HG: CPT | Mod: CPTII,S$GLB,, | Performed by: FAMILY MEDICINE

## 2021-08-24 PROCEDURE — 3074F PR MOST RECENT SYSTOLIC BLOOD PRESSURE < 130 MM HG: ICD-10-PCS | Mod: CPTII,S$GLB,, | Performed by: FAMILY MEDICINE

## 2021-08-24 PROCEDURE — 1126F PR PAIN SEVERITY QUANTIFIED, NO PAIN PRESENT: ICD-10-PCS | Mod: CPTII,S$GLB,, | Performed by: FAMILY MEDICINE

## 2021-08-24 PROCEDURE — 1159F MED LIST DOCD IN RCRD: CPT | Mod: CPTII,S$GLB,, | Performed by: FAMILY MEDICINE

## 2021-08-24 PROCEDURE — 1126F AMNT PAIN NOTED NONE PRSNT: CPT | Mod: CPTII,S$GLB,, | Performed by: FAMILY MEDICINE

## 2021-08-24 RX ORDER — PHENTERMINE HYDROCHLORIDE 37.5 MG/1
37.5 TABLET ORAL
Qty: 30 TABLET | Refills: 2 | Status: SHIPPED | OUTPATIENT
Start: 2021-08-24 | End: 2021-09-23

## 2021-09-13 ENCOUNTER — OFFICE VISIT (OUTPATIENT)
Dept: FAMILY MEDICINE | Facility: CLINIC | Age: 41
End: 2021-09-13
Payer: COMMERCIAL

## 2021-09-13 VITALS
DIASTOLIC BLOOD PRESSURE: 82 MMHG | TEMPERATURE: 99 F | SYSTOLIC BLOOD PRESSURE: 128 MMHG | WEIGHT: 254.19 LBS | HEIGHT: 66 IN | OXYGEN SATURATION: 99 % | HEART RATE: 116 BPM | BODY MASS INDEX: 40.85 KG/M2

## 2021-09-13 DIAGNOSIS — M54.31 RIGHT SCIATIC NERVE PAIN: Primary | ICD-10-CM

## 2021-09-13 PROCEDURE — 3008F BODY MASS INDEX DOCD: CPT | Mod: CPTII,S$GLB,, | Performed by: INTERNAL MEDICINE

## 2021-09-13 PROCEDURE — 3079F PR MOST RECENT DIASTOLIC BLOOD PRESSURE 80-89 MM HG: ICD-10-PCS | Mod: CPTII,S$GLB,, | Performed by: INTERNAL MEDICINE

## 2021-09-13 PROCEDURE — 1159F MED LIST DOCD IN RCRD: CPT | Mod: CPTII,S$GLB,, | Performed by: INTERNAL MEDICINE

## 2021-09-13 PROCEDURE — 3079F DIAST BP 80-89 MM HG: CPT | Mod: CPTII,S$GLB,, | Performed by: INTERNAL MEDICINE

## 2021-09-13 PROCEDURE — 3008F PR BODY MASS INDEX (BMI) DOCUMENTED: ICD-10-PCS | Mod: CPTII,S$GLB,, | Performed by: INTERNAL MEDICINE

## 2021-09-13 PROCEDURE — 1159F PR MEDICATION LIST DOCUMENTED IN MEDICAL RECORD: ICD-10-PCS | Mod: CPTII,S$GLB,, | Performed by: INTERNAL MEDICINE

## 2021-09-13 PROCEDURE — 3074F PR MOST RECENT SYSTOLIC BLOOD PRESSURE < 130 MM HG: ICD-10-PCS | Mod: CPTII,S$GLB,, | Performed by: INTERNAL MEDICINE

## 2021-09-13 PROCEDURE — 3074F SYST BP LT 130 MM HG: CPT | Mod: CPTII,S$GLB,, | Performed by: INTERNAL MEDICINE

## 2021-09-13 PROCEDURE — 99999 PR PBB SHADOW E&M-EST. PATIENT-LVL IV: CPT | Mod: PBBFAC,,, | Performed by: INTERNAL MEDICINE

## 2021-09-13 PROCEDURE — 99214 PR OFFICE/OUTPT VISIT, EST, LEVL IV, 30-39 MIN: ICD-10-PCS | Mod: S$GLB,,, | Performed by: INTERNAL MEDICINE

## 2021-09-13 PROCEDURE — 1160F RVW MEDS BY RX/DR IN RCRD: CPT | Mod: CPTII,S$GLB,, | Performed by: INTERNAL MEDICINE

## 2021-09-13 PROCEDURE — 99999 PR PBB SHADOW E&M-EST. PATIENT-LVL IV: ICD-10-PCS | Mod: PBBFAC,,, | Performed by: INTERNAL MEDICINE

## 2021-09-13 PROCEDURE — 99214 OFFICE O/P EST MOD 30 MIN: CPT | Mod: S$GLB,,, | Performed by: INTERNAL MEDICINE

## 2021-09-13 PROCEDURE — 1160F PR REVIEW ALL MEDS BY PRESCRIBER/CLIN PHARMACIST DOCUMENTED: ICD-10-PCS | Mod: CPTII,S$GLB,, | Performed by: INTERNAL MEDICINE

## 2021-09-13 RX ORDER — METHYLPREDNISOLONE ACETATE 40 MG/ML
40 INJECTION, SUSPENSION INTRA-ARTICULAR; INTRALESIONAL; INTRAMUSCULAR; SOFT TISSUE
Status: DISCONTINUED | OUTPATIENT
Start: 2021-09-13 | End: 2021-09-13

## 2021-09-13 RX ORDER — CYCLOBENZAPRINE HCL 10 MG
10 TABLET ORAL
COMMUNITY
Start: 2021-09-01 | End: 2022-02-28

## 2021-09-13 RX ORDER — KETOROLAC TROMETHAMINE 30 MG/ML
30 INJECTION, SOLUTION INTRAMUSCULAR; INTRAVENOUS
Status: DISCONTINUED | OUTPATIENT
Start: 2021-09-13 | End: 2021-09-13

## 2021-09-13 RX ORDER — GABAPENTIN 300 MG/1
300 CAPSULE ORAL NIGHTLY
Qty: 30 CAPSULE | Refills: 0 | Status: SHIPPED | OUTPATIENT
Start: 2021-09-13 | End: 2021-09-26 | Stop reason: SDUPTHER

## 2021-09-23 ENCOUNTER — PATIENT MESSAGE (OUTPATIENT)
Dept: FAMILY MEDICINE | Facility: CLINIC | Age: 41
End: 2021-09-23

## 2021-09-26 DIAGNOSIS — M54.31 RIGHT SCIATIC NERVE PAIN: ICD-10-CM

## 2021-09-26 RX ORDER — GABAPENTIN 300 MG/1
300 CAPSULE ORAL NIGHTLY
Qty: 90 CAPSULE | Refills: 0 | Status: SHIPPED | OUTPATIENT
Start: 2021-09-26 | End: 2023-08-07

## 2021-10-01 ENCOUNTER — PATIENT MESSAGE (OUTPATIENT)
Dept: FAMILY MEDICINE | Facility: CLINIC | Age: 41
End: 2021-10-01

## 2021-11-22 ENCOUNTER — OFFICE VISIT (OUTPATIENT)
Dept: FAMILY MEDICINE | Facility: CLINIC | Age: 41
End: 2021-11-22
Payer: COMMERCIAL

## 2021-11-22 VITALS
OXYGEN SATURATION: 98 % | HEART RATE: 116 BPM | HEIGHT: 66 IN | BODY MASS INDEX: 40.39 KG/M2 | TEMPERATURE: 98 F | DIASTOLIC BLOOD PRESSURE: 82 MMHG | SYSTOLIC BLOOD PRESSURE: 124 MMHG | WEIGHT: 251.31 LBS

## 2021-11-22 DIAGNOSIS — F33.0 MILD EPISODE OF RECURRENT MAJOR DEPRESSIVE DISORDER: ICD-10-CM

## 2021-11-22 DIAGNOSIS — Z12.31 ENCOUNTER FOR SCREENING MAMMOGRAM FOR BREAST CANCER: ICD-10-CM

## 2021-11-22 DIAGNOSIS — G47.00 INSOMNIA, UNSPECIFIED TYPE: Primary | ICD-10-CM

## 2021-11-22 DIAGNOSIS — G43.919 INTRACTABLE MIGRAINE WITHOUT STATUS MIGRAINOSUS, UNSPECIFIED MIGRAINE TYPE: ICD-10-CM

## 2021-11-22 PROCEDURE — 99214 OFFICE O/P EST MOD 30 MIN: CPT | Mod: S$GLB,,, | Performed by: INTERNAL MEDICINE

## 2021-11-22 PROCEDURE — 99999 PR PBB SHADOW E&M-EST. PATIENT-LVL III: ICD-10-PCS | Mod: PBBFAC,,, | Performed by: INTERNAL MEDICINE

## 2021-11-22 PROCEDURE — 99999 PR PBB SHADOW E&M-EST. PATIENT-LVL III: CPT | Mod: PBBFAC,,, | Performed by: INTERNAL MEDICINE

## 2021-11-22 PROCEDURE — 99214 PR OFFICE/OUTPT VISIT, EST, LEVL IV, 30-39 MIN: ICD-10-PCS | Mod: S$GLB,,, | Performed by: INTERNAL MEDICINE

## 2021-11-22 RX ORDER — ZOLPIDEM TARTRATE 5 MG/1
5 TABLET ORAL NIGHTLY PRN
Qty: 30 TABLET | Refills: 5 | Status: SHIPPED | OUTPATIENT
Start: 2021-11-30 | End: 2022-06-29 | Stop reason: SDUPTHER

## 2021-11-22 RX ORDER — PHENTERMINE HYDROCHLORIDE 37.5 MG/1
37.5 TABLET ORAL NIGHTLY
COMMUNITY
Start: 2021-11-02 | End: 2021-11-23

## 2021-11-22 RX ORDER — AMITRIPTYLINE HYDROCHLORIDE 50 MG/1
50 TABLET, FILM COATED ORAL NIGHTLY
Qty: 90 TABLET | Refills: 1 | Status: SHIPPED | OUTPATIENT
Start: 2021-11-22 | End: 2022-02-28 | Stop reason: SDUPTHER

## 2021-11-23 ENCOUNTER — OFFICE VISIT (OUTPATIENT)
Dept: FAMILY MEDICINE | Facility: CLINIC | Age: 41
End: 2021-11-23
Payer: COMMERCIAL

## 2021-11-23 VITALS
SYSTOLIC BLOOD PRESSURE: 126 MMHG | OXYGEN SATURATION: 99 % | BODY MASS INDEX: 40.67 KG/M2 | HEIGHT: 66 IN | WEIGHT: 253.06 LBS | HEART RATE: 97 BPM | TEMPERATURE: 99 F | DIASTOLIC BLOOD PRESSURE: 82 MMHG

## 2021-11-23 DIAGNOSIS — G89.29 CHRONIC PAIN OF BOTH KNEES: ICD-10-CM

## 2021-11-23 DIAGNOSIS — R73.03 PREDIABETES: Primary | ICD-10-CM

## 2021-11-23 DIAGNOSIS — M25.562 CHRONIC PAIN OF BOTH KNEES: ICD-10-CM

## 2021-11-23 DIAGNOSIS — M25.561 CHRONIC PAIN OF BOTH KNEES: ICD-10-CM

## 2021-11-23 PROCEDURE — 99214 PR OFFICE/OUTPT VISIT, EST, LEVL IV, 30-39 MIN: ICD-10-PCS | Mod: S$GLB,,, | Performed by: FAMILY MEDICINE

## 2021-11-23 PROCEDURE — 99214 OFFICE O/P EST MOD 30 MIN: CPT | Mod: S$GLB,,, | Performed by: FAMILY MEDICINE

## 2021-11-23 PROCEDURE — 99999 PR PBB SHADOW E&M-EST. PATIENT-LVL III: ICD-10-PCS | Mod: PBBFAC,,, | Performed by: FAMILY MEDICINE

## 2021-11-23 PROCEDURE — 99999 PR PBB SHADOW E&M-EST. PATIENT-LVL III: CPT | Mod: PBBFAC,,, | Performed by: FAMILY MEDICINE

## 2021-11-23 RX ORDER — PHENTERMINE HYDROCHLORIDE 30 MG/1
30 CAPSULE ORAL EVERY MORNING
Qty: 30 CAPSULE | Refills: 2 | Status: SHIPPED | OUTPATIENT
Start: 2021-11-23 | End: 2021-12-23

## 2021-12-08 ENCOUNTER — PATIENT MESSAGE (OUTPATIENT)
Dept: ADMINISTRATIVE | Facility: HOSPITAL | Age: 41
End: 2021-12-08
Payer: COMMERCIAL

## 2022-02-07 ENCOUNTER — TELEPHONE (OUTPATIENT)
Dept: FAMILY MEDICINE | Facility: CLINIC | Age: 42
End: 2022-02-07
Payer: COMMERCIAL

## 2022-02-14 ENCOUNTER — OFFICE VISIT (OUTPATIENT)
Dept: FAMILY MEDICINE | Facility: CLINIC | Age: 42
End: 2022-02-14
Payer: COMMERCIAL

## 2022-02-14 VITALS
SYSTOLIC BLOOD PRESSURE: 122 MMHG | OXYGEN SATURATION: 99 % | TEMPERATURE: 99 F | BODY MASS INDEX: 39.9 KG/M2 | HEIGHT: 66 IN | HEART RATE: 95 BPM | DIASTOLIC BLOOD PRESSURE: 76 MMHG | WEIGHT: 248.25 LBS

## 2022-02-14 DIAGNOSIS — R73.03 PREDIABETES: Primary | ICD-10-CM

## 2022-02-14 PROCEDURE — 99999 PR PBB SHADOW E&M-EST. PATIENT-LVL IV: ICD-10-PCS | Mod: PBBFAC,,, | Performed by: FAMILY MEDICINE

## 2022-02-14 PROCEDURE — 3078F PR MOST RECENT DIASTOLIC BLOOD PRESSURE < 80 MM HG: ICD-10-PCS | Mod: CPTII,S$GLB,, | Performed by: FAMILY MEDICINE

## 2022-02-14 PROCEDURE — 3008F BODY MASS INDEX DOCD: CPT | Mod: CPTII,S$GLB,, | Performed by: FAMILY MEDICINE

## 2022-02-14 PROCEDURE — 1160F RVW MEDS BY RX/DR IN RCRD: CPT | Mod: CPTII,S$GLB,, | Performed by: FAMILY MEDICINE

## 2022-02-14 PROCEDURE — 1159F MED LIST DOCD IN RCRD: CPT | Mod: CPTII,S$GLB,, | Performed by: FAMILY MEDICINE

## 2022-02-14 PROCEDURE — 99214 PR OFFICE/OUTPT VISIT, EST, LEVL IV, 30-39 MIN: ICD-10-PCS | Mod: S$GLB,,, | Performed by: FAMILY MEDICINE

## 2022-02-14 PROCEDURE — 1160F PR REVIEW ALL MEDS BY PRESCRIBER/CLIN PHARMACIST DOCUMENTED: ICD-10-PCS | Mod: CPTII,S$GLB,, | Performed by: FAMILY MEDICINE

## 2022-02-14 PROCEDURE — 99214 OFFICE O/P EST MOD 30 MIN: CPT | Mod: S$GLB,,, | Performed by: FAMILY MEDICINE

## 2022-02-14 PROCEDURE — 99999 PR PBB SHADOW E&M-EST. PATIENT-LVL IV: CPT | Mod: PBBFAC,,, | Performed by: FAMILY MEDICINE

## 2022-02-14 PROCEDURE — 3074F PR MOST RECENT SYSTOLIC BLOOD PRESSURE < 130 MM HG: ICD-10-PCS | Mod: CPTII,S$GLB,, | Performed by: FAMILY MEDICINE

## 2022-02-14 PROCEDURE — 3008F PR BODY MASS INDEX (BMI) DOCUMENTED: ICD-10-PCS | Mod: CPTII,S$GLB,, | Performed by: FAMILY MEDICINE

## 2022-02-14 PROCEDURE — 3074F SYST BP LT 130 MM HG: CPT | Mod: CPTII,S$GLB,, | Performed by: FAMILY MEDICINE

## 2022-02-14 PROCEDURE — 1159F PR MEDICATION LIST DOCUMENTED IN MEDICAL RECORD: ICD-10-PCS | Mod: CPTII,S$GLB,, | Performed by: FAMILY MEDICINE

## 2022-02-14 PROCEDURE — 3078F DIAST BP <80 MM HG: CPT | Mod: CPTII,S$GLB,, | Performed by: FAMILY MEDICINE

## 2022-02-14 RX ORDER — PHENTERMINE HYDROCHLORIDE 30 MG/1
30 CAPSULE ORAL EVERY MORNING
Qty: 30 CAPSULE | Refills: 2 | Status: SHIPPED | OUTPATIENT
Start: 2022-02-14 | End: 2022-03-16

## 2022-02-14 NOTE — PROGRESS NOTES
Assessment & Plan  Problem List Items Addressed This Visit        Endocrine    Prediabetes - Primary    BMI 40.0-44.9, adult    Relevant Medications    phentermine 30 MG Cap        Need to improve morning dietary choices.  Portion size is decreased.  Weight loss is still occurring with noted 5 lb loss since her last office visit.     Health Maintenance reviewed.    Follow-up: No follow-ups on file.    ______________________________________________________________________    Chief Complaint  Chief Complaint   Patient presents with    Medication Refill     Follow up     Weight Check       HPI  Claribel France is a 41 y.o. female with multiple medical diagnoses as listed in the medical history and problem list that presents for medication refill.  Pt is known to me with last appointment 11/23/2021.      Lunch veggie pizza from [employer]   Dinner:  Loaded giorgi coleman.    She has been working on her nutrition and decreasing her portion size.    Breakfast:  Chick diana or burger leonor.  Consistently with weight loss.      She has noted improved knee pain.    We discussed fasting with diet.    Improved choices for breakfast     PAST MEDICAL HISTORY:  Past Medical History:   Diagnosis Date    Depression        PAST SURGICAL HISTORY:  Past Surgical History:   Procedure Laterality Date    BACK SURGERY         SOCIAL HISTORY:  Social History     Socioeconomic History    Marital status:    Tobacco Use    Smoking status: Current Every Day Smoker     Packs/day: 0.25     Types: Cigarettes    Smokeless tobacco: Current User   Substance and Sexual Activity    Alcohol use: Yes     Comment: weekends (wine)    Drug use: No    Sexual activity: Yes     Partners: Female     Social Determinants of Health     Financial Resource Strain: Unknown    Difficulty of Paying Living Expenses: Patient refused   Food Insecurity: Unknown    Worried About Running Out of Food in the Last Year: Patient refused    Ran Out of Food in the  Last Year: Patient refused   Transportation Needs: Unknown    Lack of Transportation (Medical): Patient refused    Lack of Transportation (Non-Medical): Patient refused   Physical Activity: Unknown    Days of Exercise per Week: 1 day   Stress: Stress Concern Present    Feeling of Stress : Very much   Social Connections: Unknown    Frequency of Communication with Friends and Family: Patient refused    Frequency of Social Gatherings with Friends and Family: Patient refused    Active Member of Clubs or Organizations: Patient refused    Attends Club or Organization Meetings: Patient refused    Marital Status: Patient refused       FAMILY HISTORY:  Family History   Problem Relation Age of Onset    Depression Mother     Asthma Mother     Hypertension Father     Diabetes type II Father     Breast cancer Other        ALLERGIES AND MEDICATIONS: updated and reviewed.  Review of patient's allergies indicates:   Allergen Reactions    Topiramate Itching    Sulfa (sulfonamide antibiotics) Rash     Current Outpatient Medications   Medication Sig Dispense Refill    amitriptyline (ELAVIL) 50 MG tablet Take 1 tablet (50 mg total) by mouth every evening. 90 tablet 1    citalopram (CELEXA) 40 MG tablet Take 1 tablet (40 mg total) by mouth once daily. 90 tablet 3    cyclobenzaprine (FLEXERIL) 10 MG tablet Take 10 mg by mouth.      diclofenac (VOLTAREN) 50 MG EC tablet Take 1 tablet (50 mg total) by mouth 2 (two) times daily as needed (TAKE WITH MEALS). 180 tablet 2    gabapentin (NEURONTIN) 300 MG capsule Take 1 capsule (300 mg total) by mouth every evening. 90 capsule 0    rosuvastatin (CRESTOR) 40 MG Tab Take 1 tablet (40 mg total) by mouth every evening. 90 tablet 3    zolpidem (AMBIEN) 5 MG Tab Take 1 tablet (5 mg total) by mouth nightly as needed. 30 tablet 5    phentermine 30 MG Cap Take 1 capsule (30 mg total) by mouth every morning. 30 capsule 2    sumatriptan (IMITREX) 50 MG tablet Take 1 tablet (50 mg  "total) by mouth every 2 (two) hours as needed for Migraine. DO NOT EXCEED 200 MG PER 24 HOURS 9 tablet 2     No current facility-administered medications for this visit.         ROS  Review of Systems   Constitutional: Negative for activity change, appetite change, fatigue, fever and unexpected weight change.   HENT: Negative.  Negative for ear discharge, ear pain, rhinorrhea and sore throat.    Eyes: Negative.    Respiratory: Negative for apnea, cough, chest tightness, shortness of breath and wheezing.    Cardiovascular: Negative for chest pain, palpitations and leg swelling.   Gastrointestinal: Negative for abdominal distention, abdominal pain, constipation, diarrhea and vomiting.   Endocrine: Negative for cold intolerance, heat intolerance, polydipsia and polyuria.   Genitourinary: Negative for decreased urine volume and urgency.   Musculoskeletal: Negative.    Skin: Negative for rash.   Neurological: Negative for dizziness and headaches.   Hematological: Does not bruise/bleed easily.   Psychiatric/Behavioral: Negative for agitation, sleep disturbance and suicidal ideas.           Physical Exam  Vitals:    02/14/22 1349 02/14/22 1400   BP: (!) 132/102 122/76   BP Location: Right arm    Patient Position: Sitting    BP Method: Medium (Automatic)    Pulse: 95    Temp: 99 °F (37.2 °C)    TempSrc: Oral    SpO2: 99%    Weight: 112.6 kg (248 lb 3.8 oz)    Height: 5' 6" (1.676 m)     Body mass index is 40.07 kg/m².  Weight: 112.6 kg (248 lb 3.8 oz)   Height: 5' 6" (167.6 cm)   Physical Exam  Vitals reviewed.   Constitutional:       Appearance: She is well-developed and well-nourished.   HENT:      Head: Normocephalic and atraumatic.      Right Ear: External ear normal.      Left Ear: External ear normal.      Nose: Nose normal.      Mouth/Throat:      Mouth: Oropharynx is clear and moist.   Eyes:      Extraocular Movements: EOM normal.      Conjunctiva/sclera: Conjunctivae normal.      Pupils: Pupils are equal, round, " and reactive to light.   Cardiovascular:      Rate and Rhythm: Normal rate and regular rhythm.      Heart sounds: Normal heart sounds.   Pulmonary:      Effort: Pulmonary effort is normal.      Breath sounds: Normal breath sounds.   Skin:     General: Skin is warm and dry.   Neurological:      Mental Status: She is alert and oriented to person, place, and time.         Health Maintenance       Date Due Completion Date    TETANUS VACCINE Never done ---    Influenza Vaccine (1) Never done ---    Mammogram 11/20/2021 11/20/2020    COVID-19 Vaccine (3 - Booster for Moderna series) 03/12/2022 9/12/2021    Pneumococcal Vaccines (Age 0-64) (1 of 2 - PPSV23) 09/13/2022 (Originally [birth year]/22/1986) ---    Pap Smear with HPV Cotest 02/15/2024 2/15/2019              Patient note was created using Grokker.  Any errors in syntax or even information may not have been identified and edited on initial review prior to signing this note.

## 2022-02-28 ENCOUNTER — OFFICE VISIT (OUTPATIENT)
Dept: FAMILY MEDICINE | Facility: CLINIC | Age: 42
End: 2022-02-28
Payer: COMMERCIAL

## 2022-02-28 VITALS
RESPIRATION RATE: 18 BRPM | SYSTOLIC BLOOD PRESSURE: 134 MMHG | BODY MASS INDEX: 40.02 KG/M2 | HEIGHT: 66 IN | TEMPERATURE: 98 F | WEIGHT: 249 LBS | OXYGEN SATURATION: 98 % | DIASTOLIC BLOOD PRESSURE: 86 MMHG | HEART RATE: 98 BPM

## 2022-02-28 DIAGNOSIS — M46.1 SI (SACROILIAC) JOINT INFLAMMATION: ICD-10-CM

## 2022-02-28 DIAGNOSIS — R21 RASH: ICD-10-CM

## 2022-02-28 DIAGNOSIS — G43.919 INTRACTABLE MIGRAINE WITHOUT STATUS MIGRAINOSUS, UNSPECIFIED MIGRAINE TYPE: Primary | ICD-10-CM

## 2022-02-28 DIAGNOSIS — D47.3 ESSENTIAL (HEMORRHAGIC) THROMBOCYTHEMIA: ICD-10-CM

## 2022-02-28 DIAGNOSIS — F33.0 MILD EPISODE OF RECURRENT MAJOR DEPRESSIVE DISORDER: ICD-10-CM

## 2022-02-28 PROCEDURE — 3079F PR MOST RECENT DIASTOLIC BLOOD PRESSURE 80-89 MM HG: ICD-10-PCS | Mod: CPTII,S$GLB,, | Performed by: INTERNAL MEDICINE

## 2022-02-28 PROCEDURE — 1159F PR MEDICATION LIST DOCUMENTED IN MEDICAL RECORD: ICD-10-PCS | Mod: CPTII,S$GLB,, | Performed by: INTERNAL MEDICINE

## 2022-02-28 PROCEDURE — 3008F BODY MASS INDEX DOCD: CPT | Mod: CPTII,S$GLB,, | Performed by: INTERNAL MEDICINE

## 2022-02-28 PROCEDURE — 3075F PR MOST RECENT SYSTOLIC BLOOD PRESS GE 130-139MM HG: ICD-10-PCS | Mod: CPTII,S$GLB,, | Performed by: INTERNAL MEDICINE

## 2022-02-28 PROCEDURE — 3075F SYST BP GE 130 - 139MM HG: CPT | Mod: CPTII,S$GLB,, | Performed by: INTERNAL MEDICINE

## 2022-02-28 PROCEDURE — 1160F PR REVIEW ALL MEDS BY PRESCRIBER/CLIN PHARMACIST DOCUMENTED: ICD-10-PCS | Mod: CPTII,S$GLB,, | Performed by: INTERNAL MEDICINE

## 2022-02-28 PROCEDURE — 1160F RVW MEDS BY RX/DR IN RCRD: CPT | Mod: CPTII,S$GLB,, | Performed by: INTERNAL MEDICINE

## 2022-02-28 PROCEDURE — 99999 PR PBB SHADOW E&M-EST. PATIENT-LVL IV: CPT | Mod: PBBFAC,,, | Performed by: INTERNAL MEDICINE

## 2022-02-28 PROCEDURE — 3008F PR BODY MASS INDEX (BMI) DOCUMENTED: ICD-10-PCS | Mod: CPTII,S$GLB,, | Performed by: INTERNAL MEDICINE

## 2022-02-28 PROCEDURE — 99214 OFFICE O/P EST MOD 30 MIN: CPT | Mod: S$GLB,,, | Performed by: INTERNAL MEDICINE

## 2022-02-28 PROCEDURE — 3079F DIAST BP 80-89 MM HG: CPT | Mod: CPTII,S$GLB,, | Performed by: INTERNAL MEDICINE

## 2022-02-28 PROCEDURE — 1159F MED LIST DOCD IN RCRD: CPT | Mod: CPTII,S$GLB,, | Performed by: INTERNAL MEDICINE

## 2022-02-28 PROCEDURE — 99214 PR OFFICE/OUTPT VISIT, EST, LEVL IV, 30-39 MIN: ICD-10-PCS | Mod: S$GLB,,, | Performed by: INTERNAL MEDICINE

## 2022-02-28 PROCEDURE — 99999 PR PBB SHADOW E&M-EST. PATIENT-LVL IV: ICD-10-PCS | Mod: PBBFAC,,, | Performed by: INTERNAL MEDICINE

## 2022-02-28 RX ORDER — BETAMETHASONE VALERATE 1.2 MG/G
OINTMENT TOPICAL 2 TIMES DAILY
Qty: 45 G | Refills: 3 | Status: SHIPPED | OUTPATIENT
Start: 2022-02-28

## 2022-02-28 RX ORDER — AMITRIPTYLINE HYDROCHLORIDE 50 MG/1
50 TABLET, FILM COATED ORAL NIGHTLY
Qty: 90 TABLET | Refills: 3 | Status: SHIPPED | OUTPATIENT
Start: 2022-02-28 | End: 2022-07-04 | Stop reason: SDUPTHER

## 2022-02-28 NOTE — PROGRESS NOTES
SUBJECTIVE     Chief Complaint   Patient presents with    Medication Refill    Rash     Both hands          HPI  Claribel France is a 41 y.o. female with multiple medical diagnoses as listed in the medical history and problem list that presents for follow-up for Migraines. Pt reports her headaches are very well controlled with Elavil. She is fully compliant with meds and denies any adverse side effects. Otherwise, pt has noted a rash on both(dorsal) wrists x 1 month. The rash is itchy and painful when she bends the wrists. Pt notes it has an eczema appearance, so applied Hydrocortisone and Neosporin without improvement. Denies any changes to soaps, body washes, lotions, detergents, etc.    PAST MEDICAL HISTORY:  Past Medical History:   Diagnosis Date    Depression        PAST SURGICAL HISTORY:  Past Surgical History:   Procedure Laterality Date    BACK SURGERY         SOCIAL HISTORY:  Social History     Socioeconomic History    Marital status:    Tobacco Use    Smoking status: Current Every Day Smoker     Packs/day: 0.25     Types: Cigarettes    Smokeless tobacco: Current User   Substance and Sexual Activity    Alcohol use: Yes     Comment: weekends (wine)    Drug use: No    Sexual activity: Yes     Partners: Female     Social Determinants of Health     Financial Resource Strain: Unknown    Difficulty of Paying Living Expenses: Patient refused   Food Insecurity: Unknown    Worried About Running Out of Food in the Last Year: Patient refused    Ran Out of Food in the Last Year: Patient refused   Transportation Needs: Unknown    Lack of Transportation (Medical): Patient refused    Lack of Transportation (Non-Medical): Patient refused   Physical Activity: Unknown    Days of Exercise per Week: 1 day   Stress: Stress Concern Present    Feeling of Stress : Very much   Social Connections: Unknown    Frequency of Communication with Friends and Family: Patient refused    Frequency of Social  Gatherings with Friends and Family: Patient refused    Active Member of Clubs or Organizations: Patient refused    Attends Club or Organization Meetings: Patient refused    Marital Status: Patient refused       FAMILY HISTORY:  Family History   Problem Relation Age of Onset    Depression Mother     Asthma Mother     Hypertension Father     Diabetes type II Father     Breast cancer Other        ALLERGIES AND MEDICATIONS: updated and reviewed.  Review of patient's allergies indicates:   Allergen Reactions    Topiramate Itching    Sulfa (sulfonamide antibiotics) Rash     Current Outpatient Medications   Medication Sig Dispense Refill    citalopram (CELEXA) 40 MG tablet Take 1 tablet (40 mg total) by mouth once daily. 90 tablet 3    diclofenac (VOLTAREN) 50 MG EC tablet Take 1 tablet (50 mg total) by mouth 2 (two) times daily as needed (TAKE WITH MEALS). 180 tablet 2    gabapentin (NEURONTIN) 300 MG capsule Take 1 capsule (300 mg total) by mouth every evening. 90 capsule 0    phentermine 30 MG Cap Take 1 capsule (30 mg total) by mouth every morning. 30 capsule 2    rosuvastatin (CRESTOR) 40 MG Tab Take 1 tablet (40 mg total) by mouth every evening. 90 tablet 3    zolpidem (AMBIEN) 5 MG Tab Take 1 tablet (5 mg total) by mouth nightly as needed. 30 tablet 5    amitriptyline (ELAVIL) 50 MG tablet Take 1 tablet (50 mg total) by mouth every evening. 90 tablet 3    betamethasone valerate 0.1% (VALISONE) 0.1 % Oint Apply topically 2 (two) times daily. 45 g 3     No current facility-administered medications for this visit.       ROS  Review of Systems   Constitutional: Negative for chills and fever.   HENT: Negative for hearing loss and sore throat.    Eyes: Negative for visual disturbance.   Respiratory: Negative for cough and shortness of breath.    Cardiovascular: Negative for chest pain, palpitations and leg swelling.   Gastrointestinal: Negative for abdominal pain, constipation, diarrhea, nausea and  "vomiting.   Genitourinary: Negative for dysuria, frequency and urgency.   Musculoskeletal: Negative for arthralgias, joint swelling and myalgias.   Skin: Positive for rash (B/L wrists). Negative for wound.   Neurological: Negative for headaches.   Psychiatric/Behavioral: Negative for agitation and confusion. The patient is not nervous/anxious.          OBJECTIVE     Physical Exam  Vitals:    02/28/22 0905   BP: 134/86   Pulse: 98   Resp: 18   Temp: 98.3 °F (36.8 °C)    Body mass index is 40.19 kg/m².  Weight: 112.9 kg (249 lb)   Height: 5' 6" (167.6 cm)     Physical Exam  Constitutional:       General: She is not in acute distress.     Appearance: She is well-developed.   HENT:      Head: Normocephalic and atraumatic.      Right Ear: External ear normal.      Left Ear: External ear normal.      Nose: Nose normal.   Eyes:      General: No scleral icterus.        Right eye: No discharge.         Left eye: No discharge.      Conjunctiva/sclera: Conjunctivae normal.   Neck:      Vascular: No JVD.      Trachea: No tracheal deviation.   Cardiovascular:      Rate and Rhythm: Normal rate and regular rhythm.      Heart sounds: No murmur heard.    No friction rub. No gallop.   Pulmonary:      Effort: Pulmonary effort is normal. No respiratory distress.      Breath sounds: Normal breath sounds. No wheezing.   Abdominal:      General: Bowel sounds are normal. There is no distension.      Palpations: Abdomen is soft. There is no mass.      Tenderness: There is no abdominal tenderness. There is no guarding or rebound.   Musculoskeletal:         General: No tenderness or deformity. Normal range of motion.      Cervical back: Normal range of motion and neck supple.   Skin:     General: Skin is warm and dry.      Findings: Rash (as pictured below) present. No erythema.   Neurological:      Mental Status: She is alert and oriented to person, place, and time.      Motor: No abnormal muscle tone.      Coordination: Coordination normal. "   Psychiatric:         Mood and Affect: Affect is tearful.         Behavior: Behavior normal.         Thought Content: Thought content normal.         Judgment: Judgment normal.                           Health Maintenance       Date Due Completion Date    TETANUS VACCINE Never done ---    Influenza Vaccine (1) Never done ---    Mammogram 11/20/2021 11/20/2020    COVID-19 Vaccine (3 - Booster for Moderna series) 02/12/2022 9/12/2021    Pneumococcal Vaccines (Age 0-64) (1 of 2 - PPSV23) 09/13/2022 (Originally [birth year]/22/1986) ---    Cervical Cancer Screening 02/15/2024 2/15/2019            ASSESSMENT     41 y.o. female with     1. Intractable migraine without status migrainosus, unspecified migraine type    2. Rash    3. Mild episode of recurrent major depressive disorder    4. SI (sacroiliac) joint inflammation    5. Essential (hemorrhagic) thrombocythemia        PLAN:     1. Intractable migraine without status migrainosus, unspecified migraine type  - Stable; no acute issues  - The current medical regimen is effective;  continue present plan and medications.  - amitriptyline (ELAVIL) 50 MG tablet; Take 1 tablet (50 mg total) by mouth every evening.  Dispense: 90 tablet; Refill: 3    2. Rash  - Start steroid ointment as below  - betamethasone valerate 0.1% (VALISONE) 0.1 % Oint; Apply topically 2 (two) times daily.  Dispense: 45 g; Refill: 3    3. Mild episode of recurrent major depressive disorder  - Stable; no acute issues  - amitriptyline (ELAVIL) 50 MG tablet; Take 1 tablet (50 mg total) by mouth every evening.  Dispense: 90 tablet; Refill: 3  - Pt stressed 2/2 upcoming move; advised to start de-stressing techniques and continue meds    4. SI (sacroiliac) joint inflammation  - Stable; no acute issues    5. Essential (hemorrhagic) thrombocythemia  - Stable; no acute issues        RTC in 1-2 weeks as needed for any acute worsening of current condition or failure to improve       María Elena Garcia MD  02/28/2022 9:23  AM        No follow-ups on file.

## 2022-02-28 NOTE — PROGRESS NOTES
Health Maintenance Due   Topic Date Due    TETANUS VACCINE  Consult with pcp         Influenza Vaccine (1) Refused     Mammogram  Will schedule on her own     COVID-19 Vaccine (3 - Booster for Moderna series) 02/12/2022

## 2022-04-29 ENCOUNTER — TELEPHONE (OUTPATIENT)
Dept: FAMILY MEDICINE | Facility: CLINIC | Age: 42
End: 2022-04-29

## 2022-05-17 ENCOUNTER — TELEPHONE (OUTPATIENT)
Dept: FAMILY MEDICINE | Facility: CLINIC | Age: 42
End: 2022-05-17

## 2022-05-17 NOTE — TELEPHONE ENCOUNTER
2nd attempted to call patient, left a portal message as well. OV needs to be reschedule.   
WFL (within functional limits)

## 2022-05-30 ENCOUNTER — PATIENT MESSAGE (OUTPATIENT)
Dept: ADMINISTRATIVE | Facility: HOSPITAL | Age: 42
End: 2022-05-30

## 2022-06-29 DIAGNOSIS — G47.00 INSOMNIA, UNSPECIFIED TYPE: ICD-10-CM

## 2022-06-29 RX ORDER — ZOLPIDEM TARTRATE 5 MG/1
5 TABLET ORAL NIGHTLY PRN
Qty: 30 TABLET | Refills: 2 | Status: SHIPPED | OUTPATIENT
Start: 2022-06-29 | End: 2022-07-04 | Stop reason: SDUPTHER

## 2022-06-29 NOTE — TELEPHONE ENCOUNTER
No new care gaps identified.  [facility] Embedded Care Gaps. Reference number: [id number]. 6/29/2022   8:19:51 AM MATTYT

## 2022-06-29 NOTE — TELEPHONE ENCOUNTER
Last Office Visit Info:   The patient's last visit with María Elena Garcia MD was on 2/28/2022.    The patient's last visit in current department was on 2/28/2022.        Last CBC Results:   Lab Results   Component Value Date    WBC 3.85 (L) 01/10/2020    HGB 10.3 (L) 01/10/2020    HCT 33.5 (L) 01/10/2020     01/10/2020       Last CMP Results  Lab Results   Component Value Date     01/10/2020    K 4.3 01/10/2020     01/10/2020    CO2 25 01/10/2020    BUN 10 01/10/2020    CREATININE 0.7 01/10/2020    CALCIUM 9.5 01/10/2020    ALBUMIN 3.9 01/10/2020    AST 22 01/10/2020    ALT 21 01/10/2020       Last Lipids  Lab Results   Component Value Date    CHOL 342 (H) 01/10/2020    TRIG 162 (H) 01/10/2020    HDL 77 (H) 01/10/2020    LDLCALC 232.6 (H) 01/10/2020       Last A1C  Lab Results   Component Value Date    HGBA1C 5.7 (H) 01/10/2020       Last TSH  Lab Results   Component Value Date    TSH 0.408 01/10/2020             Current Med Refills  Medication List with Changes/Refills   Current Medications    AMITRIPTYLINE (ELAVIL) 50 MG TABLET    Take 1 tablet (50 mg total) by mouth every evening.       Start Date: 2/28/2022 End Date: 2/28/2023    BETAMETHASONE VALERATE 0.1% (VALISONE) 0.1 % OINT    Apply topically 2 (two) times daily.       Start Date: 2/28/2022 End Date: --    CITALOPRAM (CELEXA) 40 MG TABLET    Take 1 tablet (40 mg total) by mouth once daily.       Start Date: 5/31/2021 End Date: 5/31/2022    DICLOFENAC (VOLTAREN) 50 MG EC TABLET    Take 1 tablet (50 mg total) by mouth 2 (two) times daily as needed (TAKE WITH MEALS).       Start Date: 5/31/2021 End Date: --    GABAPENTIN (NEURONTIN) 300 MG CAPSULE    Take 1 capsule (300 mg total) by mouth every evening.       Start Date: 9/26/2021 End Date: 9/26/2022    ROSUVASTATIN (CRESTOR) 40 MG TAB    Take 1 tablet (40 mg total) by mouth every evening.       Start Date: 5/31/2021 End Date: 5/31/2022    ZOLPIDEM (AMBIEN) 5 MG TAB    Take 1 tablet (5 mg  total) by mouth nightly as needed.       Start Date: 11/30/2021End Date: 5/31/2022

## 2022-07-10 ENCOUNTER — PATIENT MESSAGE (OUTPATIENT)
Dept: FAMILY MEDICINE | Facility: CLINIC | Age: 42
End: 2022-07-10

## 2022-07-10 DIAGNOSIS — F41.9 ANXIETY: ICD-10-CM

## 2022-07-10 DIAGNOSIS — G47.00 INSOMNIA, UNSPECIFIED TYPE: ICD-10-CM

## 2022-07-10 DIAGNOSIS — G43.919 INTRACTABLE MIGRAINE WITHOUT STATUS MIGRAINOSUS, UNSPECIFIED MIGRAINE TYPE: ICD-10-CM

## 2022-07-10 DIAGNOSIS — F33.0 MILD EPISODE OF RECURRENT MAJOR DEPRESSIVE DISORDER: ICD-10-CM

## 2022-07-11 RX ORDER — AMITRIPTYLINE HYDROCHLORIDE 50 MG/1
50 TABLET, FILM COATED ORAL NIGHTLY
Qty: 90 TABLET | Refills: 0 | Status: SHIPPED | OUTPATIENT
Start: 2022-07-11 | End: 2023-08-07

## 2022-07-11 RX ORDER — CITALOPRAM 40 MG/1
40 TABLET, FILM COATED ORAL DAILY
Qty: 90 TABLET | Refills: 0 | Status: SHIPPED | OUTPATIENT
Start: 2022-07-11 | End: 2023-01-04

## 2022-07-11 RX ORDER — ZOLPIDEM TARTRATE 5 MG/1
5 TABLET ORAL NIGHTLY PRN
Qty: 30 TABLET | Refills: 0 | Status: SHIPPED | OUTPATIENT
Start: 2022-07-11 | End: 2023-08-07

## 2022-07-11 NOTE — TELEPHONE ENCOUNTER
No new care gaps identified.  [facility] Embedded Care Gaps. Reference number: [id number]. 7/11/2022   9:24:16 AM CDT

## 2022-07-12 ENCOUNTER — TELEPHONE (OUTPATIENT)
Dept: FAMILY MEDICINE | Facility: CLINIC | Age: 42
End: 2022-07-12

## 2023-08-07 ENCOUNTER — OFFICE VISIT (OUTPATIENT)
Dept: FAMILY MEDICINE | Facility: CLINIC | Age: 43
End: 2023-08-07
Payer: COMMERCIAL

## 2023-08-07 ENCOUNTER — TELEPHONE (OUTPATIENT)
Dept: BEHAVIORAL HEALTH | Facility: CLINIC | Age: 43
End: 2023-08-07
Payer: COMMERCIAL

## 2023-08-07 VITALS
HEIGHT: 66 IN | TEMPERATURE: 98 F | WEIGHT: 221.13 LBS | DIASTOLIC BLOOD PRESSURE: 100 MMHG | OXYGEN SATURATION: 99 % | HEART RATE: 88 BPM | BODY MASS INDEX: 35.54 KG/M2 | SYSTOLIC BLOOD PRESSURE: 148 MMHG

## 2023-08-07 DIAGNOSIS — R03.0 ELEVATED BLOOD PRESSURE READING IN OFFICE WITHOUT DIAGNOSIS OF HYPERTENSION: ICD-10-CM

## 2023-08-07 DIAGNOSIS — Z12.31 ENCOUNTER FOR SCREENING MAMMOGRAM FOR BREAST CANCER: ICD-10-CM

## 2023-08-07 DIAGNOSIS — F33.0 MILD EPISODE OF RECURRENT MAJOR DEPRESSIVE DISORDER: ICD-10-CM

## 2023-08-07 DIAGNOSIS — Z00.00 ANNUAL PHYSICAL EXAM: Primary | ICD-10-CM

## 2023-08-07 DIAGNOSIS — R73.03 PREDIABETES: ICD-10-CM

## 2023-08-07 DIAGNOSIS — E55.9 VITAMIN D DEFICIENCY: ICD-10-CM

## 2023-08-07 DIAGNOSIS — Z72.0 TOBACCO ABUSE: ICD-10-CM

## 2023-08-07 DIAGNOSIS — F41.9 ANXIETY: ICD-10-CM

## 2023-08-07 DIAGNOSIS — G47.00 INSOMNIA, UNSPECIFIED TYPE: ICD-10-CM

## 2023-08-07 PROCEDURE — 99999 PR PBB SHADOW E&M-EST. PATIENT-LVL IV: ICD-10-PCS | Mod: PBBFAC,,, | Performed by: NURSE PRACTITIONER

## 2023-08-07 PROCEDURE — 99396 PR PREVENTIVE VISIT,EST,40-64: ICD-10-PCS | Mod: S$GLB,,, | Performed by: NURSE PRACTITIONER

## 2023-08-07 PROCEDURE — 1159F PR MEDICATION LIST DOCUMENTED IN MEDICAL RECORD: ICD-10-PCS | Mod: CPTII,S$GLB,, | Performed by: NURSE PRACTITIONER

## 2023-08-07 PROCEDURE — 3080F DIAST BP >= 90 MM HG: CPT | Mod: CPTII,S$GLB,, | Performed by: NURSE PRACTITIONER

## 2023-08-07 PROCEDURE — 3080F PR MOST RECENT DIASTOLIC BLOOD PRESSURE >= 90 MM HG: ICD-10-PCS | Mod: CPTII,S$GLB,, | Performed by: NURSE PRACTITIONER

## 2023-08-07 PROCEDURE — 1160F RVW MEDS BY RX/DR IN RCRD: CPT | Mod: CPTII,S$GLB,, | Performed by: NURSE PRACTITIONER

## 2023-08-07 PROCEDURE — 3008F BODY MASS INDEX DOCD: CPT | Mod: CPTII,S$GLB,, | Performed by: NURSE PRACTITIONER

## 2023-08-07 PROCEDURE — 1159F MED LIST DOCD IN RCRD: CPT | Mod: CPTII,S$GLB,, | Performed by: NURSE PRACTITIONER

## 2023-08-07 PROCEDURE — 3008F PR BODY MASS INDEX (BMI) DOCUMENTED: ICD-10-PCS | Mod: CPTII,S$GLB,, | Performed by: NURSE PRACTITIONER

## 2023-08-07 PROCEDURE — 99396 PREV VISIT EST AGE 40-64: CPT | Mod: S$GLB,,, | Performed by: NURSE PRACTITIONER

## 2023-08-07 PROCEDURE — 1160F PR REVIEW ALL MEDS BY PRESCRIBER/CLIN PHARMACIST DOCUMENTED: ICD-10-PCS | Mod: CPTII,S$GLB,, | Performed by: NURSE PRACTITIONER

## 2023-08-07 PROCEDURE — 3077F SYST BP >= 140 MM HG: CPT | Mod: CPTII,S$GLB,, | Performed by: NURSE PRACTITIONER

## 2023-08-07 PROCEDURE — 99999 PR PBB SHADOW E&M-EST. PATIENT-LVL IV: CPT | Mod: PBBFAC,,, | Performed by: NURSE PRACTITIONER

## 2023-08-07 PROCEDURE — 3077F PR MOST RECENT SYSTOLIC BLOOD PRESSURE >= 140 MM HG: ICD-10-PCS | Mod: CPTII,S$GLB,, | Performed by: NURSE PRACTITIONER

## 2023-08-07 RX ORDER — ZOLPIDEM TARTRATE 5 MG/1
5 TABLET ORAL NIGHTLY PRN
Qty: 30 TABLET | Refills: 0 | Status: SHIPPED | OUTPATIENT
Start: 2023-08-07 | End: 2023-09-07 | Stop reason: SDUPTHER

## 2023-08-07 RX ORDER — CITALOPRAM 20 MG/1
20 TABLET, FILM COATED ORAL DAILY
Qty: 30 TABLET | Refills: 11 | Status: SHIPPED | OUTPATIENT
Start: 2023-08-07 | End: 2024-08-06

## 2023-08-07 NOTE — PROGRESS NOTES
"SUBJECTIVE     Chief Complaint   Patient presents with    Annual Exam     HPI  Claribel France is a 43 y.o. female with multiple medical diagnoses as listed in the medical history and problem list that presents for annual exam. Pt has been doing well since her last visit. She has a good appetite and eats well. Appetite waxes and wanes. Occasional fast foods. She does exercise. Walks dog daily. She sleeps for ~3 hours nightly. Reports its hard for "brain to turn off" and difficult to fall asleep. Tried melatonin with no relief in symptoms.  Pt does not take OTC supplements. She does have any current stressors. Pt is not UTD on age appropriate CA screening. Dental exam is UTD. Eye exam is UTD.     Tobacco use- 4-8 cigarettes per day. Not ready to quit.     Migraines- have not had in 1 year.     ROS as listed.   PAST MEDICAL HISTORY:  Past Medical History:   Diagnosis Date    Depression        PAST SURGICAL HISTORY:  Past Surgical History:   Procedure Laterality Date    BACK SURGERY         SOCIAL HISTORY:  Social History     Socioeconomic History    Marital status:    Tobacco Use    Smoking status: Every Day     Current packs/day: 0.25     Types: Cigarettes    Smokeless tobacco: Current   Substance and Sexual Activity    Alcohol use: Yes     Comment: weekends (wine)    Drug use: No    Sexual activity: Yes     Partners: Female     Social Determinants of Health     Financial Resource Strain: Medium Risk (8/7/2023)    Overall Financial Resource Strain (CARDIA)     Difficulty of Paying Living Expenses: Somewhat hard   Food Insecurity: Food Insecurity Present (8/7/2023)    Hunger Vital Sign     Worried About Running Out of Food in the Last Year: Sometimes true     Ran Out of Food in the Last Year: Sometimes true   Transportation Needs: No Transportation Needs (8/7/2023)    PRAPARE - Transportation     Lack of Transportation (Medical): No     Lack of Transportation (Non-Medical): No   Physical Activity: " Insufficiently Active (8/7/2023)    Exercise Vital Sign     Days of Exercise per Week: 7 days     Minutes of Exercise per Session: 20 min   Stress: Stress Concern Present (8/7/2023)    [employer] [place] [employer] - Occupational Stress Questionnaire     Feeling of Stress : Very much   Social Connections: Unknown (8/7/2023)    Social Connection and Isolation Panel [NHANES]     Frequency of Communication with Friends and Family: Once a week     Frequency of Social Gatherings with Friends and Family: Once a week     Active Member of Clubs or Organizations: No     Attends Club or Organization Meetings: Never     Marital Status:    Housing Stability: Low Risk  (8/7/2023)    Housing Stability Vital Sign     Unable to Pay for Housing in the Last Year: No     Number of Places Lived in the Last Year: 2     Unstable Housing in the Last Year: No       FAMILY HISTORY:  Family History   Problem Relation Age of Onset    Depression Mother     Asthma Mother     Hypertension Father     Diabetes type II Father     Breast cancer Other        ALLERGIES AND MEDICATIONS: updated and reviewed.  Review of patient's allergies indicates:   Allergen Reactions    Topiramate Itching    Sulfa (sulfonamide antibiotics) Rash     Current Outpatient Medications   Medication Sig Dispense Refill    betamethasone valerate 0.1% (VALISONE) 0.1 % Oint Apply topically 2 (two) times daily. (Patient not taking: Reported on 8/7/2023) 45 g 3    citalopram (CELEXA) 20 MG tablet Take 1 tablet (20 mg total) by mouth once daily. 30 tablet 11    zolpidem (AMBIEN) 5 MG Tab Take 1 tablet (5 mg total) by mouth nightly as needed (insomnia). 30 tablet 0     No current facility-administered medications for this visit.       ROS  Review of Systems   Eyes:  Positive for visual disturbance.   Psychiatric/Behavioral:  Positive for dysphoric mood and sleep disturbance. The patient is not nervous/anxious.        OBJECTIVE     Physical Exam  Vitals:     "08/07/23 0858   BP: (!) 148/100   Pulse: 88   Temp: 98.3 °F (36.8 °C)    Body mass index is 35.69 kg/m².  Weight: 100.3 kg (221 lb 1.9 oz)   Height: 5' 6" (167.6 cm)     Physical Exam  Vitals and nursing note reviewed.   Constitutional:       General: She is not in acute distress.     Appearance: Normal appearance.   HENT:      Head: Normocephalic and atraumatic.      Right Ear: Tympanic membrane normal.      Left Ear: Tympanic membrane normal.      Nose: Nose normal.      Mouth/Throat:      Mouth: Mucous membranes are moist.      Pharynx: No posterior oropharyngeal erythema.   Eyes:      Extraocular Movements: Extraocular movements intact.      Conjunctiva/sclera: Conjunctivae normal.      Pupils: Pupils are equal, round, and reactive to light.   Cardiovascular:      Rate and Rhythm: Normal rate and regular rhythm.      Heart sounds: Normal heart sounds. No murmur heard.  Pulmonary:      Effort: Pulmonary effort is normal. No respiratory distress.      Breath sounds: Normal breath sounds.   Abdominal:      General: Bowel sounds are normal. There is no distension.      Palpations: Abdomen is soft.   Musculoskeletal:      Cervical back: Normal range of motion and neck supple. No tenderness.      Right lower leg: No edema.      Left lower leg: No edema.   Skin:     General: Skin is warm and dry.      Findings: No rash.   Neurological:      Mental Status: She is alert and oriented to person, place, and time.      Gait: Gait normal.   Psychiatric:         Mood and Affect: Affect is tearful.         Health Maintenance         Date Due Completion Date    Hemoglobin A1c (Prediabetes) 01/10/2021 1/10/2020    Mammogram 11/20/2021 11/20/2020    TETANUS VACCINE 08/28/2023 (Originally 3/22/1998) ---    COVID-19 Vaccine (5 - Moderna series) 08/28/2023 (Originally 11/7/2021) 9/12/2021    Pneumococcal Vaccines (Age 0-64) (1 - PCV) 08/28/2023 (Originally 3/22/1986) ---    Influenza Vaccine (1) 09/01/2023 ---    Cervical Cancer " Screening 02/15/2024 2/15/2019              ASSESSMENT     43 y.o. female with     1. Annual physical exam    2. Insomnia, unspecified type    3. Tobacco abuse    4. Encounter for screening mammogram for breast cancer    5. Prediabetes    6. Vitamin D deficiency    7. Elevated blood pressure reading in office without diagnosis of hypertension    8. Mild episode of recurrent major depressive disorder    9. Anxiety        PLAN:     1. Annual physical exam  Counseled patient on importance of health prevention screening, immunizations, and overall wellness.    - CBC W/ AUTO DIFFERENTIAL; Future  - COMPREHENSIVE METABOLIC PANEL; Future  - LIPID PANEL; Future  - ESTROGENS, TOTAL; Future  - TSH; Future    2. Insomnia, unspecified type  - zolpidem (AMBIEN) 5 MG Tab; Take 1 tablet (5 mg total) by mouth nightly as needed (insomnia).  Dispense: 30 tablet; Refill: 0    3. Tobacco abuse  Recommend cessation.   Patient not ready to quit at this time.     4. Encounter for screening mammogram for breast cancer  - Mammo Digital Screening Bilat w/ [facility]; Future    5. Prediabetes    - Hemoglobin A1C; Future    6. Vitamin D deficiency    - Vitamin D; Future    7. Elevated blood pressure reading in office without diagnosis of hypertension  Return to clinic in 2 weeks for BP check.     8. Mild episode of recurrent major depressive disorder    - citalopram (CELEXA) 20 MG tablet; Take 1 tablet (20 mg total) by mouth once daily.  Dispense: 30 tablet; Refill: 11  - Ambulatory referral/consult to Primary Care Behavioral Health (Non-Opioids); Future    9. Anxiety    - citalopram (CELEXA) 20 MG tablet; Take 1 tablet (20 mg total) by mouth once daily.  Dispense: 30 tablet; Refill: 11  - Ambulatory referral/consult to Primary Care Behavioral Health (Non-Opioids); Future        RTC in 3 months w/ Dr. Garcia.       Jaylyn Gutierrez, DNP, APRN, FNP-C  Family Medicine  [facility]  08/07/2023 9:09 AM    Follow up in about 2 weeks (around  8/21/2023) for nurse visit for BP check.

## 2023-08-07 NOTE — PROGRESS NOTES
Behavioral Health Community Health Worker  Initial Assessment  Completed by:  Ronald Vargas    Date:  8/7/2023    Patient Enrollment in Behavioral Health Program:  Patient verbalized understanding of Behavioral Health Integration services to include:  Patient understands that CHW, LCSW, PharmD and consulting Psychiatrist are members of the care team working collaboratively with his/her primary care provider: Yes  Patient understands that activation of their [facility] patient portal account is required for accessing the full scope of team services: Yes  Patient understands that some counseling sessions may occur via video: Yes  Clinic visits with the psychiatrist may be subject to a co-pay based on your insurance: Yes  Patient consents to enroll in BHI program: Yes    Assessments     Single Item Health Literacy Scale:  How often do you need to have someone help you read instructions, pamphlets or other written material from your doctor or pharmacy?: Never    Promis 10:  Promis 10 Responses  In general, would you say your health is: Fair  In general, would you say your quality of life is: Fair  In general, how would you rate your physical health?: Fair  In general, how would you rate your mental health, including your mood and your ability to think?: Poor  In general, how would you rate your satisfaction with your social activities and relationships?: Fair  In general, please rate how well you carry out your usual social activities and roles. (This includes activities at home, at work and in your community, and responsibilities as a parent, child, spouse, employee, friend, etc.): Very good  To what extent are you able to carry out your everyday physical activities such as walking, climbing stairs, carrying groceries, or moving a chair? : Completely  How often have you been bothered by emotional problems such as feeling anxious, depressed or irritable?: Always  In the past 7 days, how would you rate your fatigue on  average?: Mild  In the past 7 days, on a scale of 0 to 10 (where 0 is no pain and 10 is the worst pain imaginable) how would you rate your pain on average?: 0  Global Physical Health: 9  Global Mental health Score: 10    Depression PHQ:  PHQ9 8/7/2023   Total Score 20         Generalized Anxiety Disorder 7-Item Scale:  GAD7 8/7/2023   1. Feeling nervous, anxious, or on edge? 2   2. Not being able to stop or control worrying? 3   3. Worrying too much about different things? 3   4. Trouble relaxing? 3   5. Being so restless that it is hard to sit still? 1   6. Becoming easily annoyed or irritable? 1   7. Feeling afraid as if something awful might happen? 1   8. If you checked off any problems, how difficult have these problems made it for you to do your work, take care of things at home, or get along with other people? 1   REHAN-7 Score 14       History     Social History     Socioeconomic History    Marital status:    Tobacco Use    Smoking status: Every Day     Current packs/day: 0.25     Types: Cigarettes    Smokeless tobacco: Current   Substance and Sexual Activity    Alcohol use: Yes     Comment: weekends (wine)    Drug use: No    Sexual activity: Yes     Partners: Female     Social Determinants of Health     Financial Resource Strain: Medium Risk (8/7/2023)    Overall Financial Resource Strain (CARDIA)     Difficulty of Paying Living Expenses: Somewhat hard   Food Insecurity: Food Insecurity Present (8/7/2023)    Hunger Vital Sign     Worried About Running Out of Food in the Last Year: Sometimes true     Ran Out of Food in the Last Year: Sometimes true   Transportation Needs: No Transportation Needs (8/7/2023)    PRAPARE - Transportation     Lack of Transportation (Medical): No     Lack of Transportation (Non-Medical): No   Physical Activity: Insufficiently Active (8/7/2023)    Exercise Vital Sign     Days of Exercise per Week: 7 days     Minutes of Exercise per Session: 20 min   Stress: Stress Concern  "Present (8/7/2023)    [employer] - Occupational Stress Questionnaire     Feeling of Stress : Very much   Social Connections: Unknown (8/7/2023)    Social Connection and Isolation Panel [NHANES]     Frequency of Communication with Friends and Family: Once a week     Frequency of Social Gatherings with Friends and Family: Once a week     Active Member of Clubs or Organizations: No     Attends Club or Organization Meetings: Never     Marital Status:    Housing Stability: Low Risk  (8/7/2023)    Housing Stability Vital Sign     Unable to Pay for Housing in the Last Year: No     Number of Places Lived in the Last Year: 2     Unstable Housing in the Last Year: No       Call Summary   Patient was referred to the BHI (Non-opioid) program by Primary Care Provider, Dr. meehan CHW contacted Claribel France who reports depression that limits [his/her] activities of daily living (ADLs).   Patient scored "20" on the PHQ9 and "14" on the REHAN 7. Based on these scores patient is eligible for the Behavioral health Integration (Non-opioid) Program. CHW completed the intake and scheduled an appointment for patient with Dr Carol Fleming, LPC,PhD on 8/25/23.     Patient given crisis number Patient denies SI/HI and or self harm thoughts at this time and no plan but has thoughts in the past two weeks . Therapist notified    "

## 2023-08-09 DIAGNOSIS — D50.9 MICROCYTIC ANEMIA: Primary | ICD-10-CM

## 2023-08-25 ENCOUNTER — CLINICAL SUPPORT (OUTPATIENT)
Dept: FAMILY MEDICINE | Facility: CLINIC | Age: 43
End: 2023-08-25
Payer: COMMERCIAL

## 2023-08-25 ENCOUNTER — TELEPHONE (OUTPATIENT)
Dept: BEHAVIORAL HEALTH | Facility: CLINIC | Age: 43
End: 2023-08-25
Payer: COMMERCIAL

## 2023-08-25 VITALS — DIASTOLIC BLOOD PRESSURE: 78 MMHG | SYSTOLIC BLOOD PRESSURE: 124 MMHG

## 2023-08-25 DIAGNOSIS — Z01.30 BLOOD PRESSURE CHECK: Primary | ICD-10-CM

## 2023-08-25 PROCEDURE — 99999 PR PBB SHADOW E&M-EST. PATIENT-LVL I: CPT | Mod: PBBFAC,,,

## 2023-08-25 PROCEDURE — 99999 PR PBB SHADOW E&M-EST. PATIENT-LVL I: ICD-10-PCS | Mod: PBBFAC,,,

## 2023-08-25 NOTE — PROGRESS NOTES
Claribel France 43 y.o. female is here today for Blood Pressure check.   History of HTN no.    Review of patient's allergies indicates:   Allergen Reactions    Topiramate Itching    Sulfa (sulfonamide antibiotics) Rash     Creatinine   Date Value Ref Range Status   08/07/2023 0.8 0.5 - 1.4 mg/dL Final     Sodium   Date Value Ref Range Status   08/07/2023 140 136 - 145 mmol/L Final     Potassium   Date Value Ref Range Status   08/07/2023 4.0 3.5 - 5.1 mmol/L Final   ]  Patient denies taking blood pressure medications on a regular basis at the same time of the day.  No blood pressure medications prescribed.    Current Outpatient Medications:     betamethasone valerate 0.1% (VALISONE) 0.1 % Oint, Apply topically 2 (two) times daily. (Patient not taking: Reported on 8/7/2023), Disp: 45 g, Rfl: 3    citalopram (CELEXA) 20 MG tablet, Take 1 tablet (20 mg total) by mouth once daily., Disp: 30 tablet, Rfl: 11    zolpidem (AMBIEN) 5 MG Tab, Take 1 tablet (5 mg total) by mouth nightly as needed (insomnia)., Disp: 30 tablet, Rfl: 0  Does patient have record of home blood pressure readings no. Readings have been averaging  - no readings.   Last dose of blood pressure medication was taken at  - no readings.  Patient is asymptomatic.   Complains of  - no complaints.    124/78 right arm, sitting, manual       NP Matt informed of nurse visit.

## 2023-08-29 ENCOUNTER — PATIENT MESSAGE (OUTPATIENT)
Dept: BEHAVIORAL HEALTH | Facility: CLINIC | Age: 43
End: 2023-08-29
Payer: COMMERCIAL

## 2023-08-30 ENCOUNTER — PATIENT MESSAGE (OUTPATIENT)
Dept: BEHAVIORAL HEALTH | Facility: CLINIC | Age: 43
End: 2023-08-30
Payer: COMMERCIAL

## 2023-08-30 ENCOUNTER — OFFICE VISIT (OUTPATIENT)
Dept: FAMILY MEDICINE | Facility: CLINIC | Age: 43
End: 2023-08-30
Payer: COMMERCIAL

## 2023-08-30 VITALS
DIASTOLIC BLOOD PRESSURE: 86 MMHG | SYSTOLIC BLOOD PRESSURE: 136 MMHG | WEIGHT: 218.94 LBS | OXYGEN SATURATION: 99 % | HEART RATE: 98 BPM | HEIGHT: 66 IN | BODY MASS INDEX: 35.19 KG/M2 | TEMPERATURE: 99 F

## 2023-08-30 DIAGNOSIS — Z20.822 ENCOUNTER FOR LABORATORY TESTING FOR COVID-19 VIRUS: ICD-10-CM

## 2023-08-30 DIAGNOSIS — J06.9 VIRAL URI WITH COUGH: Primary | ICD-10-CM

## 2023-08-30 DIAGNOSIS — J02.9 SORE THROAT: ICD-10-CM

## 2023-08-30 PROCEDURE — 99999 PR PBB SHADOW E&M-EST. PATIENT-LVL III: CPT | Mod: PBBFAC,,, | Performed by: NURSE PRACTITIONER

## 2023-08-30 PROCEDURE — 99214 OFFICE O/P EST MOD 30 MIN: CPT | Mod: S$GLB,,, | Performed by: NURSE PRACTITIONER

## 2023-08-30 PROCEDURE — 99999 PR PBB SHADOW E&M-EST. PATIENT-LVL III: ICD-10-PCS | Mod: PBBFAC,,, | Performed by: NURSE PRACTITIONER

## 2023-08-30 PROCEDURE — 3044F HG A1C LEVEL LT 7.0%: CPT | Mod: CPTII,S$GLB,, | Performed by: NURSE PRACTITIONER

## 2023-08-30 PROCEDURE — 3079F DIAST BP 80-89 MM HG: CPT | Mod: CPTII,S$GLB,, | Performed by: NURSE PRACTITIONER

## 2023-08-30 PROCEDURE — 3075F SYST BP GE 130 - 139MM HG: CPT | Mod: CPTII,S$GLB,, | Performed by: NURSE PRACTITIONER

## 2023-08-30 PROCEDURE — 3008F BODY MASS INDEX DOCD: CPT | Mod: CPTII,S$GLB,, | Performed by: NURSE PRACTITIONER

## 2023-08-30 PROCEDURE — 3008F PR BODY MASS INDEX (BMI) DOCUMENTED: ICD-10-PCS | Mod: CPTII,S$GLB,, | Performed by: NURSE PRACTITIONER

## 2023-08-30 PROCEDURE — 3075F PR MOST RECENT SYSTOLIC BLOOD PRESS GE 130-139MM HG: ICD-10-PCS | Mod: CPTII,S$GLB,, | Performed by: NURSE PRACTITIONER

## 2023-08-30 PROCEDURE — 87635 SARS-COV-2 COVID-19 AMP PRB: CPT | Performed by: NURSE PRACTITIONER

## 2023-08-30 PROCEDURE — 99214 PR OFFICE/OUTPT VISIT, EST, LEVL IV, 30-39 MIN: ICD-10-PCS | Mod: S$GLB,,, | Performed by: NURSE PRACTITIONER

## 2023-08-30 PROCEDURE — 1159F PR MEDICATION LIST DOCUMENTED IN MEDICAL RECORD: ICD-10-PCS | Mod: CPTII,S$GLB,, | Performed by: NURSE PRACTITIONER

## 2023-08-30 PROCEDURE — 3044F PR MOST RECENT HEMOGLOBIN A1C LEVEL <7.0%: ICD-10-PCS | Mod: CPTII,S$GLB,, | Performed by: NURSE PRACTITIONER

## 2023-08-30 PROCEDURE — 3079F PR MOST RECENT DIASTOLIC BLOOD PRESSURE 80-89 MM HG: ICD-10-PCS | Mod: CPTII,S$GLB,, | Performed by: NURSE PRACTITIONER

## 2023-08-30 PROCEDURE — 1159F MED LIST DOCD IN RCRD: CPT | Mod: CPTII,S$GLB,, | Performed by: NURSE PRACTITIONER

## 2023-08-30 RX ORDER — FLUTICASONE PROPIONATE 50 MCG
1 SPRAY, SUSPENSION (ML) NASAL DAILY
Qty: 18.2 ML | Refills: 0 | Status: SHIPPED | OUTPATIENT
Start: 2023-08-30

## 2023-08-30 RX ORDER — IBUPROFEN 800 MG/1
800 TABLET ORAL 3 TIMES DAILY PRN
Qty: 20 TABLET | Refills: 0 | Status: SHIPPED | OUTPATIENT
Start: 2023-08-30 | End: 2023-10-02 | Stop reason: SDUPTHER

## 2023-08-30 RX ORDER — METHYLPREDNISOLONE 4 MG/1
TABLET ORAL
Qty: 21 EACH | Refills: 0 | Status: SHIPPED | OUTPATIENT
Start: 2023-08-30 | End: 2023-09-20

## 2023-08-30 RX ORDER — CETIRIZINE HYDROCHLORIDE 10 MG/1
10 TABLET ORAL NIGHTLY
Qty: 30 TABLET | Refills: 0 | Status: SHIPPED | OUTPATIENT
Start: 2023-08-30 | End: 2024-08-29

## 2023-08-30 NOTE — PROGRESS NOTES
Subjective:       Patient ID: Claribel France is a 43 y.o. female.    Chief Complaint: Sore Throat    HPI     Clarible France is a 43 y.o. female patient that presents to clinic with a chief complaint of sore throat. Past medical and surgical history reviewed as listed. PCP is María Elena Garcia MD , she is  known  to me. Patient reports new onset of sore throat less than 24 hours ago. Denies sick contacts and lives alone.     Cough  This is a new problem. The current episode started yesterday. The problem has been gradually worsening. The problem occurs every few minutes. The cough is Non-productive. Associated symptoms include a fever, headaches, myalgias, nasal congestion, postnasal drip, rhinorrhea and shortness of breath. Pertinent negatives include no chest pain, chills, ear congestion, ear pain, heartburn, hemoptysis, rash, sore throat, sweats, weight loss or wheezing. Nothing aggravates the symptoms. She has tried OTC cough suppressant for the symptoms. The treatment provided no relief. Her past medical history is significant for bronchitis. There is no history of asthma, bronchiectasis, COPD, emphysema, environmental allergies or pneumonia.     ROS as listed.   Past Medical History:   Diagnosis Date    Depression       Past Surgical History:   Procedure Laterality Date    BACK SURGERY      SPINE SURGERY  2003/2006      Family History   Problem Relation Age of Onset    Depression Mother     Asthma Mother     Hypertension Father     Diabetes type II Father     Alcohol abuse Father     Breast cancer Other       Review of patient's allergies indicates:   Allergen Reactions    Topiramate Itching    Sulfa (sulfonamide antibiotics) Rash     Review of Systems   Constitutional:  Positive for fatigue and fever. Negative for chills and weight loss.   HENT:  Positive for postnasal drip, rhinorrhea, sinus pressure and sinus pain. Negative for ear pain and sore throat.    Respiratory:  Positive for cough and shortness of  "breath. Negative for hemoptysis and wheezing.    Cardiovascular:  Negative for chest pain.   Gastrointestinal:  Negative for heartburn.   Musculoskeletal:  Positive for arthralgias and myalgias.   Skin:  Negative for rash.   Allergic/Immunologic: Negative for environmental allergies.   Neurological:  Positive for headaches.       Objective:      Vitals:    08/30/23 1351   BP: 136/86   Pulse: 98   Temp: 99 °F (37.2 °C)   TempSrc: Oral   SpO2: 99%   Weight: 99.3 kg (218 lb 14.7 oz)   Height: 5' 6" (1.676 m)      Physical Exam  Vitals and nursing note reviewed.   Constitutional:       General: She is not in acute distress.  HENT:      Head: Normocephalic.      Right Ear: Tympanic membrane normal.      Left Ear: Tympanic membrane normal.      Nose: Rhinorrhea present.      Mouth/Throat:      Mouth: Mucous membranes are moist.      Pharynx: Posterior oropharyngeal erythema present.   Eyes:      Conjunctiva/sclera: Conjunctivae normal.      Pupils: Pupils are equal, round, and reactive to light.   Cardiovascular:      Rate and Rhythm: Normal rate and regular rhythm.      Heart sounds: Normal heart sounds. No murmur heard.  Pulmonary:      Effort: Pulmonary effort is normal. No respiratory distress.      Breath sounds: Normal breath sounds.   Musculoskeletal:      Cervical back: Normal range of motion.   Skin:     General: Skin is warm and dry.   Neurological:      Mental Status: She is alert and oriented to person, place, and time.      Gait: Gait normal.   Psychiatric:         Mood and Affect: Mood normal.         Behavior: Behavior normal.         Lab Results   Component Value Date    WBC 3.58 (L) 08/07/2023    HGB 8.4 (L) 08/07/2023    HCT 30.6 (L) 08/07/2023     08/07/2023    CHOL 206 (H) 08/07/2023    TRIG 208 (H) 08/07/2023    HDL 55 08/07/2023    ALT 38 08/07/2023    AST 52 (H) 08/07/2023     08/07/2023    K 4.0 08/07/2023     08/07/2023    CREATININE 0.8 08/07/2023    BUN 7 08/07/2023    CO2 26 " 08/07/2023    TSH 0.625 08/07/2023    HGBA1C 5.9 (H) 08/07/2023      Assessment:       1. Viral URI with cough    2. Sore throat    3. Encounter for laboratory testing for COVID-19 virus        Plan:       Viral URI with cough  Counseled patient on quarantine and isolation procedures while awaiting COVID-19 results.   Provide symptomatic treatment.   Discussed FDA approved, Paxlovid, in the event COVID-19 test is positive. Patient agreed and verbalized understanding.   -     ibuprofen (ADVIL,MOTRIN) 800 MG tablet; Take 1 tablet (800 mg total) by mouth 3 (three) times daily as needed for Pain. Take with food  Dispense: 20 tablet; Refill: 0  -     fluticasone propionate (FLONASE) 50 mcg/actuation nasal spray; 1 spray (50 mcg total) by Each Nostril route once daily.  Dispense: 18.2 mL; Refill: 0  -     cetirizine (ZYRTEC) 10 MG tablet; Take 1 tablet (10 mg total) by mouth every evening.  Dispense: 30 tablet; Refill: 0  -     methylPREDNISolone (MEDROL DOSEPACK) 4 mg tablet; use as directed  Dispense: 21 each; Refill: 0    Sore throat  -     POCT Rapid Strep A-negative.     Encounter for laboratory testing for COVID-19 virus  -     COVID-19 Routine Screening      Medication List with Changes/Refills   New Medications    CETIRIZINE (ZYRTEC) 10 MG TABLET    Take 1 tablet (10 mg total) by mouth every evening.    FLUTICASONE PROPIONATE (FLONASE) 50 MCG/ACTUATION NASAL SPRAY    1 spray (50 mcg total) by Each Nostril route once daily.    IBUPROFEN (ADVIL,MOTRIN) 800 MG TABLET    Take 1 tablet (800 mg total) by mouth 3 (three) times daily as needed for Pain. Take with food    METHYLPREDNISOLONE (MEDROL DOSEPACK) 4 MG TABLET    use as directed   Current Medications    BETAMETHASONE VALERATE 0.1% (VALISONE) 0.1 % OINT    Apply topically 2 (two) times daily.    CITALOPRAM (CELEXA) 20 MG TABLET    Take 1 tablet (20 mg total) by mouth once daily.    ZOLPIDEM (AMBIEN) 5 MG TAB    Take 1 tablet (5 mg total) by mouth nightly as needed  (insomnia).       Follow up if symptoms worsen or fail to improve, for Dr. María Elena Garcia.     Jaylyn Gutierrez, DNP, APRN, FNP-C  Family Medicine  [facility]

## 2023-08-30 NOTE — LETTER
[address] [id number] [address]  Phone: [phone]  Fax: [phone]          Return to Work/School    Patient: Claribel France  YOB: 1980   Date: 08/30/2023     To Whom It May Concern:     Claribel France was in contact with/seen in my office on 08/30/2023. COVID-19 is present in our communities across the state. There is limited testing for COVID at this time, so not all patients can be tested. In this situation, your employee meets the following criteria:     Claribel France has met the criteria for COVID-19 testing based upon symptoms, travel, and/or potential exposure. The test has been completed and is pending results at this time. During this time the employee is not able to work and should be quarantined per the Centers for Disease Control timelines.      If you have any questions or concerns, or if I can be of further assistance, please do not hesitate to contact me.     Sincerely,       Jaylyn Gutierrez, DNP

## 2023-08-30 NOTE — LETTER
[address] [address] [address]  Phone: [phone]  Fax: [phone]          Return to Work/School    Patient: Claribel France  YOB: 1980   Date: 08/31/2023     To Whom It May Concern:     Claribel France was in contact with/seen in my office on 08/31/2023. COVID-19 is present in our communities across the state. There is limited testing for COVID at this time, so not all patients can be tested. In this situation, your employee meets the following criteria:     Claribel France has met the criteria for COVID-19 testing and has a POSITIVE result. She can return to work once they are asymptomatic for 24 hours without the use of fever reducing medications AND at least five days from the start of symptoms (or from the first positive result if they have no symptoms).      If you have any questions or concerns, or if I can be of further assistance, please do not hesitate to contact me.     Sincerely,      Jaylyn Gutierrez, DNP

## 2023-08-31 ENCOUNTER — PATIENT MESSAGE (OUTPATIENT)
Dept: FAMILY MEDICINE | Facility: CLINIC | Age: 43
End: 2023-08-31
Payer: COMMERCIAL

## 2023-08-31 DIAGNOSIS — U07.1 COVID-19: Primary | ICD-10-CM

## 2023-08-31 LAB — SARS-COV-2 RNA RESP QL NAA+PROBE: DETECTED

## 2023-09-03 ENCOUNTER — PATIENT MESSAGE (OUTPATIENT)
Dept: FAMILY MEDICINE | Facility: CLINIC | Age: 43
End: 2023-09-03
Payer: COMMERCIAL

## 2023-09-03 DIAGNOSIS — G47.00 INSOMNIA, UNSPECIFIED TYPE: ICD-10-CM

## 2023-09-06 RX ORDER — ZOLPIDEM TARTRATE 5 MG/1
5 TABLET ORAL NIGHTLY PRN
Qty: 30 TABLET | Refills: 0 | OUTPATIENT
Start: 2023-09-06 | End: 2024-03-06

## 2023-09-07 RX ORDER — ZOLPIDEM TARTRATE 5 MG/1
5 TABLET ORAL NIGHTLY PRN
Qty: 30 TABLET | Refills: 0 | Status: SHIPPED | OUTPATIENT
Start: 2023-09-07 | End: 2023-10-02 | Stop reason: SDUPTHER

## 2023-09-12 ENCOUNTER — TELEPHONE (OUTPATIENT)
Dept: BEHAVIORAL HEALTH | Facility: CLINIC | Age: 43
End: 2023-09-12
Payer: COMMERCIAL

## 2023-09-18 ENCOUNTER — PATIENT MESSAGE (OUTPATIENT)
Dept: BEHAVIORAL HEALTH | Facility: CLINIC | Age: 43
End: 2023-09-18
Payer: COMMERCIAL

## 2023-10-02 DIAGNOSIS — G47.00 INSOMNIA, UNSPECIFIED TYPE: ICD-10-CM

## 2023-10-02 DIAGNOSIS — J06.9 VIRAL URI WITH COUGH: ICD-10-CM

## 2023-10-03 RX ORDER — IBUPROFEN 800 MG/1
800 TABLET ORAL 3 TIMES DAILY PRN
Qty: 20 TABLET | Refills: 0 | Status: SHIPPED | OUTPATIENT
Start: 2023-10-03 | End: 2023-11-17 | Stop reason: SDUPTHER

## 2023-10-03 RX ORDER — ZOLPIDEM TARTRATE 5 MG/1
5 TABLET ORAL NIGHTLY PRN
Qty: 30 TABLET | Refills: 0 | Status: SHIPPED | OUTPATIENT
Start: 2023-10-03 | End: 2023-11-01 | Stop reason: SDUPTHER

## 2023-11-01 DIAGNOSIS — G47.00 INSOMNIA, UNSPECIFIED TYPE: ICD-10-CM

## 2023-11-02 RX ORDER — ZOLPIDEM TARTRATE 5 MG/1
5 TABLET ORAL NIGHTLY PRN
Qty: 30 TABLET | Refills: 0 | Status: SHIPPED | OUTPATIENT
Start: 2023-11-02 | End: 2023-12-02 | Stop reason: SDUPTHER

## 2023-11-08 ENCOUNTER — PATIENT MESSAGE (OUTPATIENT)
Dept: FAMILY MEDICINE | Facility: CLINIC | Age: 43
End: 2023-11-08
Payer: COMMERCIAL

## 2023-11-17 ENCOUNTER — OFFICE VISIT (OUTPATIENT)
Dept: FAMILY MEDICINE | Facility: CLINIC | Age: 43
End: 2023-11-17
Payer: COMMERCIAL

## 2023-11-17 VITALS
WEIGHT: 214.06 LBS | TEMPERATURE: 98 F | OXYGEN SATURATION: 99 % | HEART RATE: 86 BPM | BODY MASS INDEX: 34.4 KG/M2 | SYSTOLIC BLOOD PRESSURE: 120 MMHG | HEIGHT: 66 IN | DIASTOLIC BLOOD PRESSURE: 80 MMHG

## 2023-11-17 DIAGNOSIS — M54.41 CHRONIC RIGHT-SIDED LOW BACK PAIN WITH RIGHT-SIDED SCIATICA: Primary | ICD-10-CM

## 2023-11-17 DIAGNOSIS — G89.29 CHRONIC RIGHT-SIDED LOW BACK PAIN WITH RIGHT-SIDED SCIATICA: Primary | ICD-10-CM

## 2023-11-17 PROCEDURE — 3074F PR MOST RECENT SYSTOLIC BLOOD PRESSURE < 130 MM HG: ICD-10-PCS | Mod: CPTII,S$GLB,, | Performed by: NURSE PRACTITIONER

## 2023-11-17 PROCEDURE — 3008F PR BODY MASS INDEX (BMI) DOCUMENTED: ICD-10-PCS | Mod: CPTII,S$GLB,, | Performed by: NURSE PRACTITIONER

## 2023-11-17 PROCEDURE — 3008F BODY MASS INDEX DOCD: CPT | Mod: CPTII,S$GLB,, | Performed by: NURSE PRACTITIONER

## 2023-11-17 PROCEDURE — 99213 OFFICE O/P EST LOW 20 MIN: CPT | Mod: S$GLB,,, | Performed by: NURSE PRACTITIONER

## 2023-11-17 PROCEDURE — 3044F HG A1C LEVEL LT 7.0%: CPT | Mod: CPTII,S$GLB,, | Performed by: NURSE PRACTITIONER

## 2023-11-17 PROCEDURE — 99999 PR PBB SHADOW E&M-EST. PATIENT-LVL III: ICD-10-PCS | Mod: PBBFAC,,, | Performed by: NURSE PRACTITIONER

## 2023-11-17 PROCEDURE — 3079F DIAST BP 80-89 MM HG: CPT | Mod: CPTII,S$GLB,, | Performed by: NURSE PRACTITIONER

## 2023-11-17 PROCEDURE — 99999 PR PBB SHADOW E&M-EST. PATIENT-LVL III: CPT | Mod: PBBFAC,,, | Performed by: NURSE PRACTITIONER

## 2023-11-17 PROCEDURE — 1159F MED LIST DOCD IN RCRD: CPT | Mod: CPTII,S$GLB,, | Performed by: NURSE PRACTITIONER

## 2023-11-17 PROCEDURE — 3079F PR MOST RECENT DIASTOLIC BLOOD PRESSURE 80-89 MM HG: ICD-10-PCS | Mod: CPTII,S$GLB,, | Performed by: NURSE PRACTITIONER

## 2023-11-17 PROCEDURE — 1159F PR MEDICATION LIST DOCUMENTED IN MEDICAL RECORD: ICD-10-PCS | Mod: CPTII,S$GLB,, | Performed by: NURSE PRACTITIONER

## 2023-11-17 PROCEDURE — 1160F PR REVIEW ALL MEDS BY PRESCRIBER/CLIN PHARMACIST DOCUMENTED: ICD-10-PCS | Mod: CPTII,S$GLB,, | Performed by: NURSE PRACTITIONER

## 2023-11-17 PROCEDURE — 3044F PR MOST RECENT HEMOGLOBIN A1C LEVEL <7.0%: ICD-10-PCS | Mod: CPTII,S$GLB,, | Performed by: NURSE PRACTITIONER

## 2023-11-17 PROCEDURE — 1160F RVW MEDS BY RX/DR IN RCRD: CPT | Mod: CPTII,S$GLB,, | Performed by: NURSE PRACTITIONER

## 2023-11-17 PROCEDURE — 3074F SYST BP LT 130 MM HG: CPT | Mod: CPTII,S$GLB,, | Performed by: NURSE PRACTITIONER

## 2023-11-17 PROCEDURE — 99213 PR OFFICE/OUTPT VISIT, EST, LEVL III, 20-29 MIN: ICD-10-PCS | Mod: S$GLB,,, | Performed by: NURSE PRACTITIONER

## 2023-11-17 RX ORDER — METHOCARBAMOL 500 MG/1
500 TABLET, FILM COATED ORAL 3 TIMES DAILY PRN
Qty: 30 TABLET | Refills: 0 | Status: SHIPPED | OUTPATIENT
Start: 2023-11-17 | End: 2023-11-27

## 2023-11-17 RX ORDER — IBUPROFEN 800 MG/1
800 TABLET ORAL 3 TIMES DAILY PRN
Qty: 20 TABLET | Refills: 0 | Status: SHIPPED | OUTPATIENT
Start: 2023-11-17

## 2023-11-17 NOTE — PROGRESS NOTES
Subjective:       Patient ID: Claribel France is a 43 y.o. female.    Chief Complaint: Back Pain      Claribel France is a 43 y.o. female patient that presents to clinic with a chief complaint of back pain. Past medical and surgical history reviewed as listed. PCP is María Elena Garcia MD , she is  known  to me.     Back Pain  This is a recurrent problem. The current episode started 1 to 4 weeks ago. The problem occurs constantly. The problem is unchanged. The pain is present in the gluteal and sacro-iliac. The quality of the pain is described as aching, shooting and stabbing. The pain radiates to the right foot and right thigh. The pain is at a severity of 6/10. The pain is moderate. The pain is The same all the time. The symptoms are aggravated by position, lying down, sitting and standing. Stiffness is present All day. Associated symptoms include leg pain, numbness and paresthesias. Pertinent negatives include no abdominal pain, bladder incontinence, bowel incontinence, chest pain, dysuria, fever, headaches, pelvic pain, perianal numbness, tingling, weakness or weight loss. She has tried NSAIDs for the symptoms. The treatment provided moderate relief.     No recent injury.   Past Medical History:   Diagnosis Date    Depression       Past Surgical History:   Procedure Laterality Date    BACK SURGERY      SPINE SURGERY  2003/2006      Family History   Problem Relation Age of Onset    Depression Mother     Asthma Mother     Hypertension Father     Diabetes type II Father     Alcohol abuse Father     Breast cancer Other       Review of patient's allergies indicates:   Allergen Reactions    Topiramate Itching    Sulfa (sulfonamide antibiotics) Rash     Review of Systems   Constitutional:  Negative for fever and weight loss.   Cardiovascular:  Negative for chest pain.   Gastrointestinal:  Negative for abdominal pain and bowel incontinence.   Genitourinary:  Negative for bladder incontinence, dysuria, hematuria and pelvic  "pain.   Musculoskeletal:  Positive for arthralgias and back pain.        Right gluteal pain   Neurological:  Positive for numbness and paresthesias. Negative for tingling, weakness and headaches.       Objective:      Vitals:    11/17/23 1456   BP: 120/80   Pulse: 86   Temp: 98.2 °F (36.8 °C)   TempSrc: Oral   SpO2: 99%   Weight: 97.1 kg (214 lb 1.1 oz)   Height: 5' 6" (1.676 m)      Physical Exam  Vitals and nursing note reviewed.   Constitutional:       General: She is not in acute distress.  HENT:      Head: Normocephalic.   Eyes:      Conjunctiva/sclera: Conjunctivae normal.      Pupils: Pupils are equal, round, and reactive to light.   Pulmonary:      Effort: Pulmonary effort is normal. No respiratory distress.   Musculoskeletal:      Cervical back: Normal range of motion.   Skin:     General: Skin is warm and dry.   Neurological:      Mental Status: She is alert and oriented to person, place, and time.      Gait: Gait normal.   Psychiatric:         Mood and Affect: Mood normal.         Behavior: Behavior normal.         Lab Results   Component Value Date    WBC 3.58 (L) 08/07/2023    HGB 8.4 (L) 08/07/2023    HCT 30.6 (L) 08/07/2023     08/07/2023    CHOL 206 (H) 08/07/2023    TRIG 208 (H) 08/07/2023    HDL 55 08/07/2023    ALT 38 08/07/2023    AST 52 (H) 08/07/2023     08/07/2023    K 4.0 08/07/2023     08/07/2023    CREATININE 0.8 08/07/2023    BUN 7 08/07/2023    CO2 26 08/07/2023    TSH 0.625 08/07/2023    HGBA1C 5.9 (H) 08/07/2023      Assessment:       1. Chronic right-sided low back pain with right-sided sciatica        Plan:       Chronic right-sided low back pain with right-sided sciatica  -     methocarbamoL (ROBAXIN) 500 MG Tab; Take 1 tablet (500 mg total) by mouth 3 (three) times daily as needed (muscle spasm).  Dispense: 30 tablet; Refill: 0  -     ibuprofen (ADVIL,MOTRIN) 800 MG tablet; Take 1 tablet (800 mg total) by mouth 3 (three) times daily as needed for Pain. Take with " food  Dispense: 20 tablet; Refill: 0      Medication List with Changes/Refills   New Medications    METHOCARBAMOL (ROBAXIN) 500 MG TAB    Take 1 tablet (500 mg total) by mouth 3 (three) times daily as needed (muscle spasm).   Current Medications    BETAMETHASONE VALERATE 0.1% (VALISONE) 0.1 % OINT    Apply topically 2 (two) times daily.    CETIRIZINE (ZYRTEC) 10 MG TABLET    Take 1 tablet (10 mg total) by mouth every evening.    CITALOPRAM (CELEXA) 20 MG TABLET    Take 1 tablet (20 mg total) by mouth once daily.    FLUTICASONE PROPIONATE (FLONASE) 50 MCG/ACTUATION NASAL SPRAY    1 spray (50 mcg total) by Each Nostril route once daily.    ZOLPIDEM (AMBIEN) 5 MG TAB    Take 1 tablet (5 mg total) by mouth nightly as needed (insomnia).   Changed and/or Refilled Medications    Modified Medication Previous Medication    IBUPROFEN (ADVIL,MOTRIN) 800 MG TABLET ibuprofen (ADVIL,MOTRIN) 800 MG tablet       Take 1 tablet (800 mg total) by mouth 3 (three) times daily as needed for Pain. Take with food    Take 1 tablet (800 mg total) by mouth 3 (three) times daily as needed for Pain. Take with food            [email or web address]    Jaylyn Gutierrez DNP, APRN, FNP-C  Family Medicine  [facility]    Answers submitted by the patient for this visit:  Back Pain Questionnaire (Submitted on 11/17/2023)  Chief Complaint: Back pain  genital pain: No

## 2023-11-17 NOTE — LETTER
November 17, 2023      [address] [address]  [place] [address]  Phone: [phone]  Fax: [phone]       Patient: Claribel France   YOB: 1980  Date of Visit: 11/17/2023    To Whom It May Concern:    Jose Guadalupe France  was at [facility] on 11/17/2023. The patient may return to work/school on 11/20/2023 with no restrictions. If you have any questions or concerns, or if I can be of further assistance, please do not hesitate to contact me.    Sincerely,      Jaylyn Gutierrez, DNP

## 2023-11-17 NOTE — LETTER
November 17, 2023      [address] [address]  [place] [address]  Phone: [phone]  Fax: [phone]       Patient: Claribel France   YOB: 1980  Date of Visit: 11/17/2023    To Whom It May Concern:    Jose Guadalupe France  was at [facility] on 11/17/2023. The patient may return to work/school on 11/19/2023 with no restrictions. If you have any questions or concerns, or if I can be of further assistance, please do not hesitate to contact me.    Sincerely,      Jaylyn Gutierrez, DNP

## 2023-12-02 DIAGNOSIS — G47.00 INSOMNIA, UNSPECIFIED TYPE: ICD-10-CM

## 2023-12-04 RX ORDER — ZOLPIDEM TARTRATE 5 MG/1
5 TABLET ORAL NIGHTLY PRN
Qty: 30 TABLET | Refills: 0 | Status: SHIPPED | OUTPATIENT
Start: 2023-12-04 | End: 2024-01-02 | Stop reason: SDUPTHER

## 2024-01-01 DIAGNOSIS — G47.00 INSOMNIA, UNSPECIFIED TYPE: ICD-10-CM

## 2024-01-02 ENCOUNTER — PATIENT MESSAGE (OUTPATIENT)
Dept: FAMILY MEDICINE | Facility: CLINIC | Age: 44
End: 2024-01-02
Payer: COMMERCIAL

## 2024-01-02 DIAGNOSIS — G47.00 INSOMNIA, UNSPECIFIED TYPE: ICD-10-CM

## 2024-01-02 RX ORDER — ZOLPIDEM TARTRATE 5 MG/1
5 TABLET ORAL NIGHTLY PRN
Qty: 30 TABLET | Refills: 0 | OUTPATIENT
Start: 2024-01-02 | End: 2024-07-02

## 2024-01-02 NOTE — TELEPHONE ENCOUNTER
No care due was identified.  [facility] Embedded Care Due Messages. Reference number: [id number].   1/02/2024 2:30:53 PM CST

## 2024-01-03 RX ORDER — ZOLPIDEM TARTRATE 5 MG/1
5 TABLET ORAL NIGHTLY PRN
Qty: 30 TABLET | Refills: 0 | Status: SHIPPED | OUTPATIENT
Start: 2024-01-03 | End: 2024-01-25 | Stop reason: SDUPTHER

## 2024-01-25 ENCOUNTER — OFFICE VISIT (OUTPATIENT)
Dept: FAMILY MEDICINE | Facility: CLINIC | Age: 44
End: 2024-01-25
Payer: COMMERCIAL

## 2024-01-25 VITALS
DIASTOLIC BLOOD PRESSURE: 78 MMHG | HEART RATE: 91 BPM | OXYGEN SATURATION: 99 % | TEMPERATURE: 98 F | HEIGHT: 66 IN | BODY MASS INDEX: 32.27 KG/M2 | SYSTOLIC BLOOD PRESSURE: 120 MMHG | WEIGHT: 200.81 LBS

## 2024-01-25 DIAGNOSIS — M46.1 SI (SACROILIAC) JOINT INFLAMMATION: ICD-10-CM

## 2024-01-25 DIAGNOSIS — F33.0 MILD EPISODE OF RECURRENT MAJOR DEPRESSIVE DISORDER: ICD-10-CM

## 2024-01-25 DIAGNOSIS — Z12.4 PAP SMEAR FOR CERVICAL CANCER SCREENING: ICD-10-CM

## 2024-01-25 DIAGNOSIS — D47.3 ESSENTIAL (HEMORRHAGIC) THROMBOCYTHEMIA: ICD-10-CM

## 2024-01-25 DIAGNOSIS — M54.41 CHRONIC RIGHT-SIDED LOW BACK PAIN WITH RIGHT-SIDED SCIATICA: ICD-10-CM

## 2024-01-25 DIAGNOSIS — G89.29 CHRONIC RIGHT-SIDED LOW BACK PAIN WITH RIGHT-SIDED SCIATICA: ICD-10-CM

## 2024-01-25 DIAGNOSIS — G47.00 INSOMNIA, UNSPECIFIED TYPE: Primary | ICD-10-CM

## 2024-01-25 DIAGNOSIS — F32.2 MAJOR DEPRESSIVE DISORDER, SINGLE EPISODE, SEVERE: ICD-10-CM

## 2024-01-25 PROCEDURE — 3008F BODY MASS INDEX DOCD: CPT | Mod: CPTII,S$GLB,, | Performed by: INTERNAL MEDICINE

## 2024-01-25 PROCEDURE — 3074F SYST BP LT 130 MM HG: CPT | Mod: CPTII,S$GLB,, | Performed by: INTERNAL MEDICINE

## 2024-01-25 PROCEDURE — 1159F MED LIST DOCD IN RCRD: CPT | Mod: CPTII,S$GLB,, | Performed by: INTERNAL MEDICINE

## 2024-01-25 PROCEDURE — 3078F DIAST BP <80 MM HG: CPT | Mod: CPTII,S$GLB,, | Performed by: INTERNAL MEDICINE

## 2024-01-25 PROCEDURE — 1160F RVW MEDS BY RX/DR IN RCRD: CPT | Mod: CPTII,S$GLB,, | Performed by: INTERNAL MEDICINE

## 2024-01-25 PROCEDURE — 99214 OFFICE O/P EST MOD 30 MIN: CPT | Mod: S$GLB,,, | Performed by: INTERNAL MEDICINE

## 2024-01-25 PROCEDURE — 99999 PR PBB SHADOW E&M-EST. PATIENT-LVL IV: CPT | Mod: PBBFAC,,, | Performed by: INTERNAL MEDICINE

## 2024-01-25 RX ORDER — ZOLPIDEM TARTRATE 5 MG/1
5 TABLET ORAL NIGHTLY PRN
Qty: 30 TABLET | Refills: 5 | Status: SHIPPED | OUTPATIENT
Start: 2024-02-03 | End: 2024-08-03

## 2024-01-25 RX ORDER — METHOCARBAMOL 500 MG/1
500 TABLET, FILM COATED ORAL 3 TIMES DAILY PRN
Qty: 30 TABLET | Refills: 0 | Status: SHIPPED | OUTPATIENT
Start: 2024-01-25 | End: 2024-02-04

## 2024-01-25 NOTE — PROGRESS NOTES
SUBJECTIVE     Chief Complaint   Patient presents with    Medication Refill       HPI  Claribel France is a 43 y.o. female with multiple medical diagnoses as listed in the medical history and problem list that presents for follow-up for insomnia. Pt has been doing well since her last visit. She is fully compliant with meds and denies any adverse side effects. Pt sleeps well with Ambien and does not feel groggy upon awakening. She takes her med at 8pm to get to bed by 9:30pm and sleeps until 4am, but has been requiring 10 mg of Melatonin along with it. Pt presents for med refills today and is without any other complaints.        PAST MEDICAL HISTORY:  Past Medical History:   Diagnosis Date    Depression        PAST SURGICAL HISTORY:  Past Surgical History:   Procedure Laterality Date    BACK SURGERY      SPINE SURGERY  2003/2006       SOCIAL HISTORY:  Social History     Socioeconomic History    Marital status:    Tobacco Use    Smoking status: Every Day     Current packs/day: 0.25     Average packs/day: 0.3 packs/day for 25.2 years (6.3 ttl pk-yrs)     Types: Cigarettes     Start date: 11/2/1998     Passive exposure: Yes    Smokeless tobacco: Current   Substance and Sexual Activity    Alcohol use: Yes     Alcohol/week: 2.0 standard drinks of alcohol     Types: 2 Cans of beer per week     Comment: weekends (wine)    Drug use: Never    Sexual activity: Yes     Partners: Female     Birth control/protection: None     Social Determinants of Health     Financial Resource Strain: Medium Risk (8/22/2023)    Overall Financial Resource Strain (CARDIA)     Difficulty of Paying Living Expenses: Somewhat hard   Food Insecurity: Food Insecurity Present (8/22/2023)    Hunger Vital Sign     Worried About Running Out of Food in the Last Year: Sometimes true     Ran Out of Food in the Last Year: Sometimes true   Transportation Needs: Patient Declined (8/30/2023)    PRAPARE - Transportation     Lack of Transportation  (Medical): Patient declined     Lack of Transportation (Non-Medical): Patient declined   Physical Activity: Unknown (11/17/2023)    Exercise Vital Sign     Days of Exercise per Week: Patient declined     Minutes of Exercise per Session: 20 min   Recent Concern: Physical Activity - Insufficiently Active (8/30/2023)    Exercise Vital Sign     Days of Exercise per Week: 7 days     Minutes of Exercise per Session: 20 min   Stress: No Stress Concern Present (8/30/2023)    [facility] - Occupational Stress Questionnaire     Feeling of Stress : Only a little   Recent Concern: Stress - Stress Concern Present (8/22/2023)    [facility] of [facility]ational [facility] - Occupational Stress Questionnaire     Feeling of Stress : Very much   Social Connections: Unknown (11/17/2023)    Social Connection and Isolation Panel [NHANES]     Frequency of Communication with Friends and Family: Once a week     Frequency of Social Gatherings with Friends and Family: Once a week     Active Member of Clubs or Organizations: Patient declined     Attends Club or Organization Meetings: Never     Marital Status: Patient declined   Housing Stability: Unknown (8/30/2023)    Housing Stability Vital Sign     Unable to Pay for Housing in the Last Year: Patient refused     Number of Places Lived in the Last Year: 2     Unstable Housing in the Last Year: Patient refused   Recent Concern: Housing Stability - High Risk (8/22/2023)    Housing Stability Vital Sign     Unable to Pay for Housing in the Last Year: Yes     Number of Places Lived in the Last Year: 2     Unstable Housing in the Last Year: No       FAMILY HISTORY:  Family History   Problem Relation Age of Onset    Depression Mother     Asthma Mother     Hypertension Father     Diabetes type II Father     Alcohol abuse Father     Breast cancer Other        ALLERGIES AND MEDICATIONS: updated and reviewed.  Review of patient's allergies indicates:   Allergen Reactions     Topiramate Itching    Sulfa (sulfonamide antibiotics) Rash     Current Outpatient Medications   Medication Sig Dispense Refill    betamethasone valerate 0.1% (VALISONE) 0.1 % Oint Apply topically 2 (two) times daily. 45 g 3    cetirizine (ZYRTEC) 10 MG tablet Take 1 tablet (10 mg total) by mouth every evening. 30 tablet 0    citalopram (CELEXA) 20 MG tablet Take 1 tablet (20 mg total) by mouth once daily. 30 tablet 11    fluticasone propionate (FLONASE) 50 mcg/actuation nasal spray 1 spray (50 mcg total) by Each Nostril route once daily. 18.2 mL 0    ibuprofen (ADVIL,MOTRIN) 800 MG tablet Take 1 tablet (800 mg total) by mouth 3 (three) times daily as needed for Pain. Take with food 20 tablet 0    methocarbamoL (ROBAXIN) 500 MG Tab Take 1 tablet (500 mg total) by mouth 3 (three) times daily as needed (MAY CAUSE DROWSINESS; IF SO, ONLY TAKE NIGHTLY AS NEEDED). 30 tablet 0    [START ON 2/3/2024] zolpidem (AMBIEN) 5 MG Tab Take 1 tablet (5 mg total) by mouth nightly as needed (insomnia). 30 tablet 5     No current facility-administered medications for this visit.       ROS  Review of Systems   Constitutional:  Negative for chills and fever.   HENT:  Negative for hearing loss and sore throat.    Eyes:  Negative for visual disturbance.   Respiratory:  Negative for cough and shortness of breath.    Cardiovascular:  Negative for chest pain, palpitations and leg swelling.   Gastrointestinal:  Negative for abdominal pain, constipation, diarrhea, nausea and vomiting.   Genitourinary:  Negative for dysuria, frequency and urgency.   Musculoskeletal:  Negative for arthralgias, joint swelling and myalgias.   Skin:  Negative for rash and wound.   Neurological:  Negative for headaches.   Psychiatric/Behavioral:  Positive for sleep disturbance. Negative for agitation and confusion. The patient is not nervous/anxious.          OBJECTIVE     Physical Exam  Vitals:    01/25/24 0852   BP: 120/78   Pulse: 91   Temp: 98 °F (36.7 °C)     "Body mass index is 32.42 kg/m².  Weight: 91.1 kg (200 lb 13.4 oz)   Height: 5' 6" (167.6 cm)     Physical Exam  Constitutional:       General: She is not in acute distress.     Appearance: She is well-developed.   HENT:      Head: Normocephalic and atraumatic.      Right Ear: External ear normal.      Left Ear: External ear normal.      Nose: Nose normal.   Eyes:      General: No scleral icterus.        Right eye: No discharge.         Left eye: No discharge.      Conjunctiva/sclera: Conjunctivae normal.   Neck:      Vascular: No JVD.      Trachea: No tracheal deviation.   Pulmonary:      Effort: Pulmonary effort is normal. No respiratory distress.   Musculoskeletal:         General: No tenderness or deformity. Normal range of motion.      Cervical back: Normal range of motion and neck supple.   Skin:     General: Skin is warm and dry.      Findings: No erythema or rash.   Neurological:      Mental Status: She is alert and oriented to person, place, and time.      Motor: No abnormal muscle tone.      Coordination: Coordination normal.   Psychiatric:         Behavior: Behavior normal.         Thought Content: Thought content normal.         Judgment: Judgment normal.           Health Maintenance         Date Due Completion Date    Pneumococcal Vaccines (Age 0-64) (1 - PCV) Never done ---    TETANUS VACCINE Never done ---    High Dose Statin Never done ---    Mammogram 11/20/2021 11/20/2020    Influenza Vaccine (1) Never done ---    COVID-19 Vaccine (5 - 2023-24 season) 09/01/2023 9/12/2021    Cervical Cancer Screening 02/15/2024 2/15/2019    Hemoglobin A1c (Prediabetes) 08/07/2024 8/7/2023              ASSESSMENT     43 y.o. female with     1. Insomnia, unspecified type    2. Chronic right-sided low back pain with right-sided sciatica    3. Major depressive disorder, single episode, severe    4. Essential (hemorrhagic) thrombocythemia    5. SI (sacroiliac) joint inflammation    6. Mild episode of recurrent major " depressive disorder    7. Pap smear for cervical cancer screening        PLAN:     1. Insomnia, unspecified type  - Pt requesting to increase to 10 mg, but informed her that 5 mg is the max for females; she voiced understanding   - zolpidem (AMBIEN) 5 MG Tab; Take 1 tablet (5 mg total) by mouth nightly as needed (insomnia).  Dispense: 30 tablet; Refill: 5    2. Chronic right-sided low back pain with right-sided sciatica  - Stable; no acute issues  - The current medical regimen is effective;  continue present plan and medications.  - methocarbamoL (ROBAXIN) 500 MG Tab; Take 1 tablet (500 mg total) by mouth 3 (three) times daily as needed (MAY CAUSE DROWSINESS; IF SO, ONLY TAKE NIGHTLY AS NEEDED).  Dispense: 30 tablet; Refill: 0    3. Major depressive disorder, single episode, severe  - Stable; no acute issues    4. Essential (hemorrhagic) thrombocythemia  - Stable; no acute issues    5. SI (sacroiliac) joint inflammation  - Stable; no acute issues    6. Mild episode of recurrent major depressive disorder  - Stable; no acute issues    7. Pap smear for cervical cancer screening  - Ambulatory referral/consult to Obstetrics / Gynecology; Future        RTC in 6 months     María Elena Garcia MD  01/25/2024 9:15 AM        No follow-ups on file.

## 2024-07-28 DIAGNOSIS — F33.0 MILD EPISODE OF RECURRENT MAJOR DEPRESSIVE DISORDER: ICD-10-CM

## 2024-07-28 DIAGNOSIS — F41.9 ANXIETY: ICD-10-CM

## 2024-07-29 RX ORDER — CITALOPRAM 20 MG/1
20 TABLET, FILM COATED ORAL
Qty: 30 TABLET | Refills: 0 | Status: SHIPPED | OUTPATIENT
Start: 2024-07-29

## 2024-07-30 DIAGNOSIS — F41.9 ANXIETY: ICD-10-CM

## 2024-07-30 DIAGNOSIS — F33.0 MILD EPISODE OF RECURRENT MAJOR DEPRESSIVE DISORDER: ICD-10-CM

## 2024-07-30 DIAGNOSIS — G47.00 INSOMNIA, UNSPECIFIED TYPE: ICD-10-CM

## 2024-07-31 RX ORDER — CITALOPRAM 20 MG/1
20 TABLET, FILM COATED ORAL
Qty: 30 TABLET | Refills: 0 | OUTPATIENT
Start: 2024-07-31

## 2024-07-31 RX ORDER — ZOLPIDEM TARTRATE 5 MG/1
5 TABLET ORAL NIGHTLY PRN
Qty: 30 TABLET | Refills: 0 | Status: SHIPPED | OUTPATIENT
Start: 2024-07-31 | End: 2025-01-29

## 2024-07-31 NOTE — TELEPHONE ENCOUNTER
No care due was identified.  [facility] Embedded Care Due Messages. Reference number: [id number].   7/30/2024 7:15:22 PM CDT

## 2024-08-05 ENCOUNTER — PATIENT OUTREACH (OUTPATIENT)
Dept: ADMINISTRATIVE | Facility: HOSPITAL | Age: 44
End: 2024-08-05
Payer: COMMERCIAL

## 2024-08-09 ENCOUNTER — OFFICE VISIT (OUTPATIENT)
Dept: FAMILY MEDICINE | Facility: CLINIC | Age: 44
End: 2024-08-09
Payer: COMMERCIAL

## 2024-08-09 ENCOUNTER — HOSPITAL ENCOUNTER (OUTPATIENT)
Dept: RADIOLOGY | Facility: HOSPITAL | Age: 44
Discharge: HOME | End: 2024-08-09
Attending: INTERNAL MEDICINE
Payer: COMMERCIAL

## 2024-08-09 VITALS
HEIGHT: 66 IN | SYSTOLIC BLOOD PRESSURE: 134 MMHG | OXYGEN SATURATION: 99 % | HEART RATE: 88 BPM | DIASTOLIC BLOOD PRESSURE: 86 MMHG | WEIGHT: 214.06 LBS | BODY MASS INDEX: 34.4 KG/M2 | TEMPERATURE: 98 F

## 2024-08-09 DIAGNOSIS — G43.019 INTRACTABLE MIGRAINE WITHOUT AURA AND WITHOUT STATUS MIGRAINOSUS: ICD-10-CM

## 2024-08-09 DIAGNOSIS — R73.03 PREDIABETES: ICD-10-CM

## 2024-08-09 DIAGNOSIS — F33.0 MILD EPISODE OF RECURRENT MAJOR DEPRESSIVE DISORDER: ICD-10-CM

## 2024-08-09 DIAGNOSIS — Z12.31 ENCOUNTER FOR SCREENING MAMMOGRAM FOR BREAST CANCER: ICD-10-CM

## 2024-08-09 DIAGNOSIS — G47.00 INSOMNIA, UNSPECIFIED TYPE: Primary | ICD-10-CM

## 2024-08-09 DIAGNOSIS — F41.9 ANXIETY: ICD-10-CM

## 2024-08-09 PROCEDURE — 99999 PR PBB SHADOW E&M-EST. PATIENT-LVL IV: CPT | Mod: PBBFAC,,, | Performed by: INTERNAL MEDICINE

## 2024-08-09 PROCEDURE — 77067 SCR MAMMO BI INCL CAD: CPT | Mod: TC

## 2024-08-09 PROCEDURE — 77067 SCR MAMMO BI INCL CAD: CPT | Mod: 26,,, | Performed by: RADIOLOGY

## 2024-08-09 PROCEDURE — 77063 BREAST TOMOSYNTHESIS BI: CPT | Mod: 26,,, | Performed by: RADIOLOGY

## 2024-08-09 RX ORDER — CITALOPRAM 20 MG/1
20 TABLET, FILM COATED ORAL DAILY
Qty: 90 TABLET | Refills: 3 | Status: SHIPPED | OUTPATIENT
Start: 2024-08-09

## 2024-08-09 RX ORDER — ZOLPIDEM TARTRATE 5 MG/1
5 TABLET ORAL NIGHTLY PRN
Qty: 30 TABLET | Refills: 5 | Status: SHIPPED | OUTPATIENT
Start: 2024-08-31 | End: 2025-03-01

## 2024-08-09 RX ORDER — SUMATRIPTAN 50 MG/1
50 TABLET, FILM COATED ORAL
Qty: 9 TABLET | Refills: 2 | Status: SHIPPED | OUTPATIENT
Start: 2024-08-09 | End: 2024-09-08

## 2024-08-12 DIAGNOSIS — R74.01 ELEVATED TRANSAMINASE LEVEL: Primary | ICD-10-CM

## 2024-08-19 ENCOUNTER — OFFICE VISIT (OUTPATIENT)
Dept: FAMILY MEDICINE | Facility: CLINIC | Age: 44
End: 2024-08-19
Payer: COMMERCIAL

## 2024-08-19 VITALS
OXYGEN SATURATION: 98 % | WEIGHT: 216.94 LBS | SYSTOLIC BLOOD PRESSURE: 138 MMHG | TEMPERATURE: 98 F | BODY MASS INDEX: 34.86 KG/M2 | HEART RATE: 97 BPM | RESPIRATION RATE: 16 BRPM | DIASTOLIC BLOOD PRESSURE: 90 MMHG | HEIGHT: 66 IN

## 2024-08-19 DIAGNOSIS — R03.0 ELEVATED BLOOD PRESSURE READING IN OFFICE WITHOUT DIAGNOSIS OF HYPERTENSION: ICD-10-CM

## 2024-08-19 DIAGNOSIS — Z01.419 WELL WOMAN EXAM WITH ROUTINE GYNECOLOGICAL EXAM: Primary | ICD-10-CM

## 2024-08-19 PROCEDURE — 3080F DIAST BP >= 90 MM HG: CPT | Mod: CPTII,S$GLB,, | Performed by: NURSE PRACTITIONER

## 2024-08-19 PROCEDURE — 99999 PR PBB SHADOW E&M-EST. PATIENT-LVL III: CPT | Mod: PBBFAC,,, | Performed by: NURSE PRACTITIONER

## 2024-08-19 PROCEDURE — 3008F BODY MASS INDEX DOCD: CPT | Mod: CPTII,S$GLB,, | Performed by: NURSE PRACTITIONER

## 2024-08-19 PROCEDURE — 1159F MED LIST DOCD IN RCRD: CPT | Mod: CPTII,S$GLB,, | Performed by: NURSE PRACTITIONER

## 2024-08-19 PROCEDURE — 87491 CHLMYD TRACH DNA AMP PROBE: CPT | Performed by: NURSE PRACTITIONER

## 2024-08-19 PROCEDURE — 3075F SYST BP GE 130 - 139MM HG: CPT | Mod: CPTII,S$GLB,, | Performed by: NURSE PRACTITIONER

## 2024-08-19 PROCEDURE — 99396 PREV VISIT EST AGE 40-64: CPT | Mod: S$GLB,,, | Performed by: NURSE PRACTITIONER

## 2024-08-19 PROCEDURE — 3044F HG A1C LEVEL LT 7.0%: CPT | Mod: CPTII,S$GLB,, | Performed by: NURSE PRACTITIONER

## 2024-08-19 PROCEDURE — 81514 NFCT DS BV&VAGINITIS DNA ALG: CPT | Performed by: NURSE PRACTITIONER

## 2024-08-19 PROCEDURE — 87591 N.GONORRHOEAE DNA AMP PROB: CPT | Performed by: NURSE PRACTITIONER

## 2024-08-19 NOTE — PROGRESS NOTES
Health Maintenance Due   Topic     Pneumococcal Vaccines (Age 0-64) (1 of 2 - PCV)     TETANUS VACCINE      COVID-19 Vaccine (5 - 2023-24 season) CONSULT WITH PCP    Cervical Cancer Screening

## 2024-08-19 NOTE — PROGRESS NOTES
"Subjective:       Claribel France is a 44 y.o. female here for a routine exam.  Current complaints: None.  Personal health questionnaire reviewed: yes.     Gynecologic History  Patient's last menstrual period was 2024.  Contraception: none  Last Pap: 2/15/2019. Results were: normal  Last mammogram: 2024. Results were: normal    Obstetric History  OB History    Para Term  AB Living   4 1 1     1   SAB IAB Ectopic Multiple Live Births                  # Outcome Date GA Lbr Mihir/2nd Weight Sex Type Anes PTL Lv   4             3             2             1 Term                The following portions of the patient's history were reviewed and updated as appropriate: allergies, current medications, past family history, past medical history, past social history, past surgical history, and problem list.    Review of Systems  A comprehensive review of systems was negative.      Objective:      BP (!) 138/90 (BP Location: Right arm, Patient Position: Sitting, BP Method: Large (Manual))   Pulse 97   Temp 98.3 °F (36.8 °C) (Oral)   Resp 16   Ht 5' 6" (1.676 m)   Wt 98.4 kg (216 lb 14.9 oz)   LMP 2024   SpO2 98%   BMI 35.01 kg/m²   General appearance: alert, appears stated age, and cooperative  Head: Normocephalic, without obvious abnormality, atraumatic  Pelvic: cervix normal in appearance, external genitalia normal, no adnexal masses or tenderness, no cervical motion tenderness, positive findings: vaginal discharge:  mucoid, rectovaginal septum normal, uterus normal size, shape, and consistency, and vagina normal without discharge  Extremities: extremities normal, atraumatic, no cyanosis or edema  Neurologic: Alert and oriented X 3, normal strength and tone. Normal symmetric reflexes. Normal coordination and gait      Assessment:      Healthy female exam.      Plan:     1. Well woman exam with routine gynecological exam  Counseled patient on importance of health " prevention screening, immunizations, and overall wellness.   Reviewed mammogram 8/2024-normal.   Return to clinic for Flu vaccine in September.   - Liquid-Based Pap Smear, Screening  - HPV High Risk Genotypes, PCR  - Vaginosis Screen by DNA Probe  - C. trachomatis/N. gonorrhoeae by AMP DNA [facility]; Cervicovaginal    2. Elevated blood pressure reading in office without diagnosis of hypertension  Patient had caffeine prior to visit.   Will monitor BP at home.       Education reviewed: self breast exams and colon rectal cancer screening at 46 yo.  Follow up in: 1 year.

## 2024-08-21 LAB
C TRACH DNA SPEC QL NAA+PROBE: NOT DETECTED
N GONORRHOEA DNA SPEC QL NAA+PROBE: NOT DETECTED

## 2024-08-23 LAB
BACTERIAL VAGINOSIS DNA: POSITIVE
CANDIDA GLABRATA DNA: POSITIVE
CANDIDA KRUSEI DNA: NEGATIVE
CANDIDA RRNA VAG QL PROBE: NEGATIVE
T VAGINALIS RRNA GENITAL QL PROBE: NEGATIVE

## 2024-08-26 DIAGNOSIS — B37.31 CANDIDA VAGINITIS: Primary | ICD-10-CM

## 2024-08-26 DIAGNOSIS — B96.89 BACTERIAL VAGINOSIS: ICD-10-CM

## 2024-08-26 DIAGNOSIS — N76.0 BACTERIAL VAGINOSIS: ICD-10-CM

## 2024-08-26 RX ORDER — METRONIDAZOLE 500 MG/1
500 TABLET ORAL EVERY 12 HOURS
Qty: 14 TABLET | Refills: 0 | Status: SHIPPED | OUTPATIENT
Start: 2024-08-26 | End: 2024-09-02

## 2024-08-26 RX ORDER — FLUCONAZOLE 150 MG/1
150 TABLET ORAL DAILY
Qty: 1 TABLET | Refills: 0 | Status: SHIPPED | OUTPATIENT
Start: 2024-08-26 | End: 2024-08-27

## 2025-02-03 ENCOUNTER — OFFICE VISIT (OUTPATIENT)
Dept: FAMILY MEDICINE | Facility: CLINIC | Age: 45
End: 2025-02-03
Payer: COMMERCIAL

## 2025-02-03 VITALS
BODY MASS INDEX: 34.93 KG/M2 | WEIGHT: 217.38 LBS | HEIGHT: 66 IN | DIASTOLIC BLOOD PRESSURE: 88 MMHG | OXYGEN SATURATION: 99 % | SYSTOLIC BLOOD PRESSURE: 132 MMHG | TEMPERATURE: 99 F | HEART RATE: 81 BPM

## 2025-02-03 DIAGNOSIS — F41.9 ANXIETY: ICD-10-CM

## 2025-02-03 DIAGNOSIS — D47.3 ESSENTIAL (HEMORRHAGIC) THROMBOCYTHEMIA: ICD-10-CM

## 2025-02-03 DIAGNOSIS — E66.01 SEVERE OBESITY (BMI 35.0-39.9) WITH COMORBIDITY: ICD-10-CM

## 2025-02-03 DIAGNOSIS — G47.00 INSOMNIA, UNSPECIFIED TYPE: Primary | ICD-10-CM

## 2025-02-03 DIAGNOSIS — L65.9 HAIR LOSS: ICD-10-CM

## 2025-02-03 DIAGNOSIS — F33.0 MILD EPISODE OF RECURRENT MAJOR DEPRESSIVE DISORDER: ICD-10-CM

## 2025-02-03 PROBLEM — F32.2 MAJOR DEPRESSIVE DISORDER, SINGLE EPISODE, SEVERE: Status: RESOLVED | Noted: 2024-01-25 | Resolved: 2025-02-03

## 2025-02-03 PROCEDURE — 99214 OFFICE O/P EST MOD 30 MIN: CPT | Mod: S$GLB,,, | Performed by: INTERNAL MEDICINE

## 2025-02-03 PROCEDURE — 99999 PR PBB SHADOW E&M-EST. PATIENT-LVL IV: CPT | Mod: PBBFAC,,, | Performed by: INTERNAL MEDICINE

## 2025-02-03 PROCEDURE — 3079F DIAST BP 80-89 MM HG: CPT | Mod: CPTII,S$GLB,, | Performed by: INTERNAL MEDICINE

## 2025-02-03 PROCEDURE — 3075F SYST BP GE 130 - 139MM HG: CPT | Mod: CPTII,S$GLB,, | Performed by: INTERNAL MEDICINE

## 2025-02-03 PROCEDURE — 1160F RVW MEDS BY RX/DR IN RCRD: CPT | Mod: CPTII,S$GLB,, | Performed by: INTERNAL MEDICINE

## 2025-02-03 PROCEDURE — G2211 COMPLEX E/M VISIT ADD ON: HCPCS | Mod: S$GLB,,, | Performed by: INTERNAL MEDICINE

## 2025-02-03 PROCEDURE — 3008F BODY MASS INDEX DOCD: CPT | Mod: CPTII,S$GLB,, | Performed by: INTERNAL MEDICINE

## 2025-02-03 PROCEDURE — 1159F MED LIST DOCD IN RCRD: CPT | Mod: CPTII,S$GLB,, | Performed by: INTERNAL MEDICINE

## 2025-02-03 RX ORDER — CITALOPRAM 40 MG/1
40 TABLET, FILM COATED ORAL DAILY
Qty: 90 TABLET | Refills: 1 | Status: SHIPPED | OUTPATIENT
Start: 2025-02-03

## 2025-02-03 RX ORDER — ZOLPIDEM TARTRATE 5 MG/1
5 TABLET ORAL NIGHTLY PRN
Qty: 30 TABLET | Refills: 5 | Status: SHIPPED | OUTPATIENT
Start: 2025-02-28 | End: 2025-08-29

## 2025-02-03 NOTE — PROGRESS NOTES
"SUBJECTIVE     Chief Complaint   Patient presents with    Insomnia       HPI  Claribel France is a 44 y.o. female with multiple medical diagnoses as listed in the medical history and problem list that presents for medication refill for her insomnia and depression medication. Patient endorses that 3 weeks ago she had a "depressive episode," in which she states that she disconnected from the world for 72 hours. As a result, she decided to double the dose of her Celexa, now 40 mg at night, to which she said has helped with irritability, and crying episodes. Patient has not endorsed any additional symptoms since increasing the dose of her Celexa.    Regarding insomnia, patient also states that she takes 20 mg of melatonin as well for about a year, as the patient states that the Ambien does not consistently keep her asleep. Currently, the patient states that she is able to sleep through the night with the current regimen and does not endorse any additional symptoms.    PAST MEDICAL HISTORY:  Past Medical History:   Diagnosis Date    Depression        PAST SURGICAL HISTORY:  Past Surgical History:   Procedure Laterality Date    BACK SURGERY      SPINE SURGERY  2003/2006       SOCIAL HISTORY:  Social History     Socioeconomic History    Marital status:    Tobacco Use    Smoking status: Every Day     Current packs/day: 0.25     Average packs/day: 0.3 packs/day for 26.3 years (6.6 ttl pk-yrs)     Types: Cigarettes     Start date: 11/2/1998     Passive exposure: Yes    Smokeless tobacco: Current   Substance and Sexual Activity    Alcohol use: Yes     Alcohol/week: 2.0 standard drinks of alcohol     Types: 2 Cans of beer per week     Comment: weekends (wine)    Drug use: Never    Sexual activity: Yes     Partners: Female     Birth control/protection: None     Social Drivers of Health     Financial Resource Strain: Medium Risk (2/3/2025)    Overall Financial Resource Strain (CARDIA)     Difficulty of Paying " Living Expenses: Somewhat hard   Food Insecurity: Food Insecurity Present (2/3/2025)    Hunger Vital Sign     Worried About Running Out of Food in the Last Year: Sometimes true     Ran Out of Food in the Last Year: Sometimes true   Transportation Needs: Patient Declined (8/30/2023)    PRAPARE - Transportation     Lack of Transportation (Medical): Patient declined     Lack of Transportation (Non-Medical): Patient declined   Physical Activity: Insufficiently Active (2/3/2025)    Exercise Vital Sign     Days of Exercise per Week: 7 days     Minutes of Exercise per Session: 20 min   Stress: Stress Concern Present (2/3/2025)    [employer] [place] [facility] [employer] [facility] - Occupational Stress Questionnaire     Feeling of Stress : Very much   Housing Stability: High Risk (2/3/2025)    Housing Stability Vital Sign     Unable to Pay for Housing in the Last Year: Yes       FAMILY HISTORY:  Family History   Problem Relation Name Age of Onset    Depression Mother Vicki Hawa     Asthma Mother Vicki Kirkland     Hypertension Father Shad Bello Jr.     Diabetes type II Father Shad Bello Jr.     Alcohol abuse Father Shad Bello Jr.     Breast cancer Other great aunt        ALLERGIES AND MEDICATIONS: updated and reviewed.  Review of patient's allergies indicates:   Allergen Reactions    Benadryl allergy-sinus     Topiramate Itching    Sulfa (sulfonamide antibiotics) Rash     Current Outpatient Medications   Medication Sig Dispense Refill    ibuprofen (ADVIL,MOTRIN) 800 MG tablet Take 1 tablet (800 mg total) by mouth 3 (three) times daily as needed for Pain. Take with food 20 tablet 0    cetirizine (ZYRTEC) 10 MG tablet Take 1 tablet (10 mg total) by mouth every evening. 30 tablet 0    citalopram (CELEXA) 40 MG tablet Take 1 tablet (40 mg total) by mouth once daily. 90 tablet 1    sumatriptan (IMITREX) 50 MG tablet Take 1 tablet (50 mg total) by mouth every 2 (two) hours as needed for Migraine (DO NOT EXCEED 200 MG PER 24 HOURS).  "9 tablet 2    [START ON 2/28/2025] zolpidem (AMBIEN) 5 MG Tab Take 1 tablet (5 mg total) by mouth nightly as needed (insomnia). 30 tablet 5     No current facility-administered medications for this visit.       ROS  Review of Systems   Constitutional: Negative.    HENT:  Positive for postnasal drip.    Eyes: Negative.    Respiratory:  Positive for cough.    Cardiovascular: Negative.    Gastrointestinal: Negative.    Endocrine: Positive for heat intolerance.   Genitourinary: Negative.    Musculoskeletal:  Positive for arthralgias and back pain.   Skin: Negative.    Allergic/Immunologic: Positive for environmental allergies.   Neurological: Negative.    Hematological: Negative.    Psychiatric/Behavioral: Negative.           OBJECTIVE     Physical Exam  Vitals:    02/03/25 0813   BP: 132/88   Pulse: 81   Temp: 99 °F (37.2 °C)    Body mass index is 35.09 kg/m².  Weight: 98.6 kg (217 lb 6 oz)   Height: 5' 6" (167.6 cm)     Physical Exam  Constitutional:       Appearance: Normal appearance. She is obese.   HENT:      Head: Normocephalic.      Right Ear: External ear normal.      Left Ear: External ear normal.   Cardiovascular:      Rate and Rhythm: Normal rate and regular rhythm.      Pulses: Normal pulses.      Heart sounds: Normal heart sounds.   Pulmonary:      Effort: Pulmonary effort is normal.      Breath sounds: Wheezing present.   Abdominal:      General: Abdomen is flat. Bowel sounds are normal.      Palpations: Abdomen is soft.   Skin:     General: Skin is warm and dry.   Neurological:      General: No focal deficit present.      Mental Status: She is alert and oriented to person, place, and time. Mental status is at baseline.   Psychiatric:         Mood and Affect: Mood normal.         Behavior: Behavior normal.         Thought Content: Thought content normal.         Judgment: Judgment normal.     Wheezing present in upper left lung lobe    Health Maintenance         Date Due Completion Date    TETANUS VACCINE " Never done ---    Pneumococcal Vaccines (Age 0-49) (1 of 2 - PCV) Never done ---    Influenza Vaccine (1) Never done ---    COVID-19 Vaccine (5 - 2024-25 season) 09/01/2024 9/12/2021    Hemoglobin A1c (Prediabetes) 08/09/2025 8/9/2024    Mammogram 08/09/2025 8/9/2024    Cervical Cancer Screening 08/19/2029 8/19/2024    RSV Vaccine (Age 60+ and Pregnant patients) (1 - 1-dose 75+ series) 03/22/2055 ---              ASSESSMENT     44 y.o. female with     1. Insomnia, unspecified type    2. Anxiety    3. Mild episode of recurrent major depressive disorder    4. Hair loss    5. Essential (hemorrhagic) thrombocythemia    6. Severe obesity (BMI 35.0-39.9) with comorbidity        PLAN:     1. Insomnia, unspecified type  - Patient currently stable with current medications, continue current regiment  - Of note, patient takes 20 mg melatonin PRN as well OTC  - zolpidem (AMBIEN) 5 MG Tab; Take 1 tablet (5 mg total) by mouth nightly as needed (insomnia).  Dispense: 30 tablet; Refill: 5    2. Anxiety  - Patient doubled 20 mg dosage 3 weeks ago and remains stable after acute stressor  - citalopram (CELEXA) 40 MG tablet; Take 1 tablet (40 mg total) by mouth once daily.  Dispense: 90 tablet; Refill: 1    3. Mild episode of recurrent major depressive disorder  - Patient doubled 20 mg dosage 3 weeks ago and remains stable after acute stressor  - citalopram (CELEXA) 40 MG tablet; Take 1 tablet (40 mg total) by mouth once daily.  Dispense: 90 tablet; Refill: 1    4. Hair loss  - Ambulatory referral/consult to Dermatology; Future    5. Essential (hemorrhagic) thrombocythemia  - Stable; no acute issues    6. Severe obesity (BMI 35.0-39.9) with comorbidity  - The patient is asked to make an attempt to improve diet and exercise patterns to aid in management of this problem.            Oleg Gaines, MS4  02/03/2025 8:21 AM        No follow-ups on file.

## 2025-02-03 NOTE — PROGRESS NOTES
1. Insomnia, unspecified type  - Stable; no acute issues  - The current medical regimen is effective;  continue present plan and medications.  - Pt also taking OTC Melatonin  - zolpidem (AMBIEN) 5 MG Tab; Take 1 tablet (5 mg total) by mouth nightly as needed (insomnia).  Dispense: 30 tablet; Refill: 5    2. Anxiety  - Pt increased Celexa from 20 to 40 mg after a recent stressor; okay to continue as such  - citalopram (CELEXA) 40 MG tablet; Take 1 tablet (40 mg total) by mouth once daily.  Dispense: 90 tablet; Refill: 1    3. Mild episode of recurrent major depressive disorder  - As above  - citalopram (CELEXA) 40 MG tablet; Take 1 tablet (40 mg total) by mouth once daily.  Dispense: 90 tablet; Refill: 1    4. Hair loss  - Ambulatory referral/consult to Dermatology; Future    5. Essential (hemorrhagic) thrombocythemia  - Stable; no acute issues    6. Severe obesity (BMI 35.0-39.9) with comorbidity  - Discussed importance of eating a prudent diet and exercising      RTC in 6 months

## 2025-08-11 ENCOUNTER — OFFICE VISIT (OUTPATIENT)
Dept: FAMILY MEDICINE | Facility: CLINIC | Age: 45
End: 2025-08-11
Payer: COMMERCIAL

## 2025-08-11 VITALS
HEIGHT: 66 IN | DIASTOLIC BLOOD PRESSURE: 82 MMHG | SYSTOLIC BLOOD PRESSURE: 130 MMHG | BODY MASS INDEX: 34.72 KG/M2 | TEMPERATURE: 99 F | HEART RATE: 85 BPM | WEIGHT: 216.06 LBS | OXYGEN SATURATION: 99 %

## 2025-08-11 DIAGNOSIS — F33.0 MILD EPISODE OF RECURRENT MAJOR DEPRESSIVE DISORDER: ICD-10-CM

## 2025-08-11 DIAGNOSIS — F51.01 PRIMARY INSOMNIA: ICD-10-CM

## 2025-08-11 DIAGNOSIS — F41.9 ANXIETY: Primary | ICD-10-CM

## 2025-08-11 DIAGNOSIS — Z12.31 ENCOUNTER FOR SCREENING MAMMOGRAM FOR BREAST CANCER: ICD-10-CM

## 2025-08-11 PROCEDURE — 3079F DIAST BP 80-89 MM HG: CPT | Mod: CPTII,S$GLB,, | Performed by: NURSE PRACTITIONER

## 2025-08-11 PROCEDURE — 1160F RVW MEDS BY RX/DR IN RCRD: CPT | Mod: CPTII,S$GLB,, | Performed by: NURSE PRACTITIONER

## 2025-08-11 PROCEDURE — 99213 OFFICE O/P EST LOW 20 MIN: CPT | Mod: S$GLB,,, | Performed by: NURSE PRACTITIONER

## 2025-08-11 PROCEDURE — 3075F SYST BP GE 130 - 139MM HG: CPT | Mod: CPTII,S$GLB,, | Performed by: NURSE PRACTITIONER

## 2025-08-11 PROCEDURE — 3008F BODY MASS INDEX DOCD: CPT | Mod: CPTII,S$GLB,, | Performed by: NURSE PRACTITIONER

## 2025-08-11 PROCEDURE — 99999 PR PBB SHADOW E&M-EST. PATIENT-LVL IV: CPT | Mod: PBBFAC,,, | Performed by: NURSE PRACTITIONER

## 2025-08-11 PROCEDURE — 1159F MED LIST DOCD IN RCRD: CPT | Mod: CPTII,S$GLB,, | Performed by: NURSE PRACTITIONER

## 2025-08-11 RX ORDER — CITALOPRAM 40 MG/1
40 TABLET ORAL DAILY
Qty: 90 TABLET | Refills: 1 | Status: CANCELLED | OUTPATIENT
Start: 2025-08-11

## 2025-08-11 RX ORDER — CITALOPRAM 40 MG/1
40 TABLET ORAL DAILY
Qty: 90 TABLET | Refills: 1 | Status: SHIPPED | OUTPATIENT
Start: 2025-08-11

## 2025-08-11 RX ORDER — ZOLPIDEM TARTRATE 5 MG/1
5 TABLET ORAL NIGHTLY PRN
Qty: 30 TABLET | Refills: 0 | Status: SHIPPED | OUTPATIENT
Start: 2025-08-11 | End: 2026-02-09

## 2025-08-11 RX ORDER — ZOLPIDEM TARTRATE 5 MG/1
5 TABLET ORAL NIGHTLY PRN
Qty: 30 TABLET | Refills: 5 | Status: CANCELLED | OUTPATIENT
Start: 2025-08-11 | End: 2026-02-09

## 2025-08-13 DIAGNOSIS — R73.03 PREDIABETES: ICD-10-CM

## 2025-08-19 DIAGNOSIS — F41.9 ANXIETY: ICD-10-CM

## 2025-08-19 DIAGNOSIS — F33.0 MILD EPISODE OF RECURRENT MAJOR DEPRESSIVE DISORDER: ICD-10-CM

## 2025-08-19 RX ORDER — CITALOPRAM 20 MG/1
20 TABLET ORAL
Qty: 30 TABLET | Refills: 0 | OUTPATIENT
Start: 2025-08-19